# Patient Record
Sex: MALE | Race: WHITE | Employment: OTHER | URBAN - METROPOLITAN AREA
[De-identification: names, ages, dates, MRNs, and addresses within clinical notes are randomized per-mention and may not be internally consistent; named-entity substitution may affect disease eponyms.]

---

## 2017-01-17 ENCOUNTER — OFFICE VISIT (OUTPATIENT)
Dept: CARDIOLOGY CLINIC | Age: 67
End: 2017-01-17

## 2017-01-17 VITALS
HEART RATE: 60 BPM | BODY MASS INDEX: 27.13 KG/M2 | OXYGEN SATURATION: 98 % | HEIGHT: 68 IN | DIASTOLIC BLOOD PRESSURE: 78 MMHG | SYSTOLIC BLOOD PRESSURE: 126 MMHG | WEIGHT: 179 LBS

## 2017-01-17 DIAGNOSIS — I10 ESSENTIAL HYPERTENSION: ICD-10-CM

## 2017-01-17 DIAGNOSIS — E78.2 MIXED HYPERLIPIDEMIA: Primary | ICD-10-CM

## 2017-01-17 DIAGNOSIS — I25.10 CORONARY ARTERY DISEASE INVOLVING NATIVE CORONARY ARTERY OF NATIVE HEART WITHOUT ANGINA PECTORIS: ICD-10-CM

## 2017-01-17 PROCEDURE — 99214 OFFICE O/P EST MOD 30 MIN: CPT | Performed by: INTERNAL MEDICINE

## 2017-03-03 ENCOUNTER — OFFICE VISIT (OUTPATIENT)
Dept: FAMILY MEDICINE CLINIC | Age: 67
End: 2017-03-03

## 2017-03-03 VITALS
BODY MASS INDEX: 29.03 KG/M2 | WEIGHT: 185 LBS | HEIGHT: 67 IN | OXYGEN SATURATION: 97 % | DIASTOLIC BLOOD PRESSURE: 80 MMHG | HEART RATE: 63 BPM | SYSTOLIC BLOOD PRESSURE: 120 MMHG

## 2017-03-03 DIAGNOSIS — Z12.11 COLON CANCER SCREENING: ICD-10-CM

## 2017-03-03 DIAGNOSIS — Z23 NEED FOR PNEUMOCOCCAL VACCINATION: ICD-10-CM

## 2017-03-03 DIAGNOSIS — I10 ESSENTIAL HYPERTENSION: ICD-10-CM

## 2017-03-03 DIAGNOSIS — Z00.00 ROUTINE GENERAL MEDICAL EXAMINATION AT A HEALTH CARE FACILITY: Primary | ICD-10-CM

## 2017-03-03 LAB
HEPATITIS C ANTIBODY INTERPRETATION: NORMAL
T4 FREE: 1.2 NG/DL (ref 0.9–1.8)
TSH SERPL DL<=0.05 MIU/L-ACNC: 3.33 UIU/ML (ref 0.27–4.2)

## 2017-03-03 PROCEDURE — 99397 PER PM REEVAL EST PAT 65+ YR: CPT | Performed by: FAMILY MEDICINE

## 2017-03-03 PROCEDURE — G0009 ADMIN PNEUMOCOCCAL VACCINE: HCPCS | Performed by: FAMILY MEDICINE

## 2017-03-03 PROCEDURE — 36415 COLL VENOUS BLD VENIPUNCTURE: CPT | Performed by: FAMILY MEDICINE

## 2017-03-03 PROCEDURE — 90732 PPSV23 VACC 2 YRS+ SUBQ/IM: CPT | Performed by: FAMILY MEDICINE

## 2017-03-03 RX ORDER — LEVOTHYROXINE SODIUM 0.05 MG/1
TABLET ORAL
Qty: 30 TABLET | Refills: 5 | Status: SHIPPED | OUTPATIENT
Start: 2017-03-03 | End: 2018-01-12 | Stop reason: SDUPTHER

## 2017-03-04 LAB
ESTIMATED AVERAGE GLUCOSE: 131.2 MG/DL
HBA1C MFR BLD: 6.2 %

## 2017-03-28 ENCOUNTER — TELEPHONE (OUTPATIENT)
Dept: FAMILY MEDICINE CLINIC | Age: 67
End: 2017-03-28

## 2017-03-29 RX ORDER — AZITHROMYCIN 250 MG/1
250 TABLET, FILM COATED ORAL DAILY
Qty: 6 TABLET | Refills: 0 | Status: SHIPPED | OUTPATIENT
Start: 2017-03-29 | End: 2017-04-04

## 2017-07-28 DIAGNOSIS — R05.3 CHRONIC COUGH: ICD-10-CM

## 2017-07-28 DIAGNOSIS — R06.02 SHORTNESS OF BREATH: ICD-10-CM

## 2017-07-28 RX ORDER — ALBUTEROL SULFATE 90 UG/1
1-2 AEROSOL, METERED RESPIRATORY (INHALATION) EVERY 4 HOURS PRN
Qty: 1 INHALER | Refills: 1 | Status: SHIPPED | OUTPATIENT
Start: 2017-07-28 | End: 2018-03-09 | Stop reason: ALTCHOICE

## 2017-08-08 ENCOUNTER — OFFICE VISIT (OUTPATIENT)
Dept: CARDIOLOGY CLINIC | Age: 67
End: 2017-08-08

## 2017-08-08 VITALS
SYSTOLIC BLOOD PRESSURE: 122 MMHG | HEART RATE: 57 BPM | WEIGHT: 175 LBS | BODY MASS INDEX: 27.47 KG/M2 | HEIGHT: 67 IN | OXYGEN SATURATION: 97 % | DIASTOLIC BLOOD PRESSURE: 80 MMHG

## 2017-08-08 DIAGNOSIS — I10 ESSENTIAL HYPERTENSION: ICD-10-CM

## 2017-08-08 DIAGNOSIS — E78.2 MIXED HYPERLIPIDEMIA: Primary | ICD-10-CM

## 2017-08-08 DIAGNOSIS — I25.10 CORONARY ARTERY DISEASE INVOLVING NATIVE CORONARY ARTERY OF NATIVE HEART WITHOUT ANGINA PECTORIS: ICD-10-CM

## 2017-08-08 DIAGNOSIS — E03.9 HYPOTHYROIDISM, UNSPECIFIED TYPE: ICD-10-CM

## 2017-08-08 PROCEDURE — 99214 OFFICE O/P EST MOD 30 MIN: CPT | Performed by: INTERNAL MEDICINE

## 2017-08-08 RX ORDER — CLOPIDOGREL BISULFATE 75 MG/1
75 TABLET ORAL DAILY
Qty: 90 TABLET | Refills: 3 | Status: SHIPPED | OUTPATIENT
Start: 2017-08-08 | End: 2018-09-07 | Stop reason: SDUPTHER

## 2017-08-22 ENCOUNTER — OFFICE VISIT (OUTPATIENT)
Dept: FAMILY MEDICINE CLINIC | Age: 67
End: 2017-08-22

## 2017-08-22 VITALS
DIASTOLIC BLOOD PRESSURE: 86 MMHG | OXYGEN SATURATION: 98 % | WEIGHT: 173 LBS | BODY MASS INDEX: 27.15 KG/M2 | SYSTOLIC BLOOD PRESSURE: 120 MMHG | HEIGHT: 67 IN | HEART RATE: 59 BPM

## 2017-08-22 DIAGNOSIS — Z12.5 SCREENING PSA (PROSTATE SPECIFIC ANTIGEN): ICD-10-CM

## 2017-08-22 DIAGNOSIS — E78.2 MIXED HYPERLIPIDEMIA: ICD-10-CM

## 2017-08-22 DIAGNOSIS — E03.9 HYPOTHYROIDISM, UNSPECIFIED TYPE: ICD-10-CM

## 2017-08-22 DIAGNOSIS — Z12.11 COLON CANCER SCREENING: ICD-10-CM

## 2017-08-22 DIAGNOSIS — I10 ESSENTIAL HYPERTENSION: Primary | ICD-10-CM

## 2017-08-22 LAB
A/G RATIO: 1.6 (ref 1.1–2.2)
ALBUMIN SERPL-MCNC: 4.4 G/DL (ref 3.4–5)
ALP BLD-CCNC: 60 U/L (ref 40–129)
ALT SERPL-CCNC: 19 U/L (ref 10–40)
ANION GAP SERPL CALCULATED.3IONS-SCNC: 16 MMOL/L (ref 3–16)
AST SERPL-CCNC: 16 U/L (ref 15–37)
BASOPHILS ABSOLUTE: 0 K/UL (ref 0–0.2)
BASOPHILS RELATIVE PERCENT: 0.5 %
BILIRUB SERPL-MCNC: 0.9 MG/DL (ref 0–1)
BUN BLDV-MCNC: 11 MG/DL (ref 7–20)
CALCIUM SERPL-MCNC: 9.5 MG/DL (ref 8.3–10.6)
CHLORIDE BLD-SCNC: 101 MMOL/L (ref 99–110)
CHOLESTEROL, TOTAL: 216 MG/DL (ref 0–199)
CO2: 27 MMOL/L (ref 21–32)
CREAT SERPL-MCNC: 0.7 MG/DL (ref 0.8–1.3)
EOSINOPHILS ABSOLUTE: 0.2 K/UL (ref 0–0.6)
EOSINOPHILS RELATIVE PERCENT: 3.7 %
GFR AFRICAN AMERICAN: >60
GFR NON-AFRICAN AMERICAN: >60
GLOBULIN: 2.8 G/DL
GLUCOSE BLD-MCNC: 109 MG/DL (ref 70–99)
HCT VFR BLD CALC: 47.3 % (ref 40.5–52.5)
HDLC SERPL-MCNC: 51 MG/DL (ref 40–60)
HEMOGLOBIN: 15.8 G/DL (ref 13.5–17.5)
LDL CHOLESTEROL CALCULATED: 150 MG/DL
LYMPHOCYTES ABSOLUTE: 1.3 K/UL (ref 1–5.1)
LYMPHOCYTES RELATIVE PERCENT: 22.6 %
MCH RBC QN AUTO: 32 PG (ref 26–34)
MCHC RBC AUTO-ENTMCNC: 33.3 G/DL (ref 31–36)
MCV RBC AUTO: 95.8 FL (ref 80–100)
MONOCYTES ABSOLUTE: 0.5 K/UL (ref 0–1.3)
MONOCYTES RELATIVE PERCENT: 8.9 %
NEUTROPHILS ABSOLUTE: 3.7 K/UL (ref 1.7–7.7)
NEUTROPHILS RELATIVE PERCENT: 64.3 %
PDW BLD-RTO: 13.7 % (ref 12.4–15.4)
PLATELET # BLD: 197 K/UL (ref 135–450)
PMV BLD AUTO: 8.6 FL (ref 5–10.5)
POTASSIUM SERPL-SCNC: 4.6 MMOL/L (ref 3.5–5.1)
PROSTATE SPECIFIC ANTIGEN: 0.2 NG/ML (ref 0–4)
RBC # BLD: 4.93 M/UL (ref 4.2–5.9)
SODIUM BLD-SCNC: 144 MMOL/L (ref 136–145)
T4 FREE: 1.2 NG/DL (ref 0.9–1.8)
TOTAL PROTEIN: 7.2 G/DL (ref 6.4–8.2)
TRIGL SERPL-MCNC: 75 MG/DL (ref 0–150)
TSH SERPL DL<=0.05 MIU/L-ACNC: 3.14 UIU/ML (ref 0.27–4.2)
VLDLC SERPL CALC-MCNC: 15 MG/DL
WBC # BLD: 5.8 K/UL (ref 4–11)

## 2017-08-22 PROCEDURE — 36415 COLL VENOUS BLD VENIPUNCTURE: CPT | Performed by: FAMILY MEDICINE

## 2017-08-22 PROCEDURE — 99214 OFFICE O/P EST MOD 30 MIN: CPT | Performed by: FAMILY MEDICINE

## 2017-08-22 ASSESSMENT — ENCOUNTER SYMPTOMS
ABDOMINAL PAIN: 0
SHORTNESS OF BREATH: 0

## 2017-09-05 ENCOUNTER — TELEPHONE (OUTPATIENT)
Dept: FAMILY MEDICINE CLINIC | Age: 67
End: 2017-09-05

## 2017-10-10 ENCOUNTER — TELEPHONE (OUTPATIENT)
Dept: FAMILY MEDICINE CLINIC | Age: 67
End: 2017-10-10

## 2017-10-10 DIAGNOSIS — H91.92 DECREASED HEARING, LEFT: ICD-10-CM

## 2017-10-10 DIAGNOSIS — H92.02 LEFT EAR PAIN: Primary | ICD-10-CM

## 2018-02-27 RX ORDER — AZITHROMYCIN 250 MG/1
250 TABLET, FILM COATED ORAL DAILY
Qty: 10 TABLET | Refills: 0 | Status: SHIPPED | OUTPATIENT
Start: 2018-02-27 | End: 2018-03-09 | Stop reason: ALTCHOICE

## 2018-03-07 ENCOUNTER — TELEPHONE (OUTPATIENT)
Dept: CARDIOLOGY CLINIC | Age: 68
End: 2018-03-07

## 2018-03-07 NOTE — TELEPHONE ENCOUNTER
Clearance form printed and given to [place] for Dr. Cristian Shine. Dr. Merary Sosa will be in office on Friday 3/9/18.

## 2018-03-09 ENCOUNTER — OFFICE VISIT (OUTPATIENT)
Dept: FAMILY MEDICINE CLINIC | Age: 68
End: 2018-03-09

## 2018-03-09 VITALS
WEIGHT: 180 LBS | DIASTOLIC BLOOD PRESSURE: 70 MMHG | HEIGHT: 67 IN | HEART RATE: 74 BPM | OXYGEN SATURATION: 95 % | BODY MASS INDEX: 28.25 KG/M2 | SYSTOLIC BLOOD PRESSURE: 120 MMHG

## 2018-03-09 DIAGNOSIS — E03.9 HYPOTHYROIDISM, UNSPECIFIED TYPE: ICD-10-CM

## 2018-03-09 DIAGNOSIS — R73.02 IGT (IMPAIRED GLUCOSE TOLERANCE): ICD-10-CM

## 2018-03-09 DIAGNOSIS — E78.2 MIXED HYPERLIPIDEMIA: ICD-10-CM

## 2018-03-09 DIAGNOSIS — Z23 NEED FOR SHINGLES VACCINE: ICD-10-CM

## 2018-03-09 DIAGNOSIS — Z00.00 ROUTINE GENERAL MEDICAL EXAMINATION AT A HEALTH CARE FACILITY: Primary | ICD-10-CM

## 2018-03-09 LAB
A/G RATIO: 1.4 (ref 1.1–2.2)
ALBUMIN SERPL-MCNC: 4.4 G/DL (ref 3.4–5)
ALP BLD-CCNC: 60 U/L (ref 40–129)
ALT SERPL-CCNC: 19 U/L (ref 10–40)
ANION GAP SERPL CALCULATED.3IONS-SCNC: 19 MMOL/L (ref 3–16)
AST SERPL-CCNC: 17 U/L (ref 15–37)
BILIRUB SERPL-MCNC: 0.5 MG/DL (ref 0–1)
BUN BLDV-MCNC: 16 MG/DL (ref 7–20)
CALCIUM SERPL-MCNC: 8.8 MG/DL (ref 8.3–10.6)
CHLORIDE BLD-SCNC: 100 MMOL/L (ref 99–110)
CHOLESTEROL, TOTAL: 198 MG/DL (ref 0–199)
CO2: 24 MMOL/L (ref 21–32)
CREAT SERPL-MCNC: 0.7 MG/DL (ref 0.8–1.3)
GFR AFRICAN AMERICAN: >60
GFR NON-AFRICAN AMERICAN: >60
GLOBULIN: 3.1 G/DL
GLUCOSE BLD-MCNC: 102 MG/DL (ref 70–99)
HDLC SERPL-MCNC: 46 MG/DL (ref 40–60)
LDL CHOLESTEROL CALCULATED: 139 MG/DL
POTASSIUM SERPL-SCNC: 4.4 MMOL/L (ref 3.5–5.1)
SODIUM BLD-SCNC: 143 MMOL/L (ref 136–145)
T4 FREE: 1.3 NG/DL (ref 0.9–1.8)
TOTAL PROTEIN: 7.5 G/DL (ref 6.4–8.2)
TRIGL SERPL-MCNC: 66 MG/DL (ref 0–150)
TSH SERPL DL<=0.05 MIU/L-ACNC: 3.12 UIU/ML (ref 0.27–4.2)
VLDLC SERPL CALC-MCNC: 13 MG/DL

## 2018-03-09 PROCEDURE — G0438 PPPS, INITIAL VISIT: HCPCS | Performed by: FAMILY MEDICINE

## 2018-03-09 ASSESSMENT — PATIENT HEALTH QUESTIONNAIRE - PHQ9: SUM OF ALL RESPONSES TO PHQ QUESTIONS 1-9: 0

## 2018-03-09 ASSESSMENT — LIFESTYLE VARIABLES
HOW MANY STANDARD DRINKS CONTAINING ALCOHOL DO YOU HAVE ON A TYPICAL DAY: 0
HOW OFTEN DO YOU HAVE A DRINK CONTAINING ALCOHOL: 3
HOW OFTEN DO YOU HAVE SIX OR MORE DRINKS ON ONE OCCASION: 0
AUDIT-C TOTAL SCORE: 3

## 2018-03-09 ASSESSMENT — ANXIETY QUESTIONNAIRES: GAD7 TOTAL SCORE: 0

## 2018-03-09 NOTE — PATIENT INSTRUCTIONS
Personalized Preventive Plan for Brittney Tsang - 3/9/2018  Medicare offers a range of preventive health benefits. Some of the tests and screenings are paid in full while other may be subject to a deductible, co-insurance, and/or copay. Some of these benefits include a comprehensive review of your medical history including lifestyle, illnesses that may run in your family, and various assessments and screenings as appropriate. After reviewing your medical record and screening and assessments performed today your provider may have ordered immunizations, labs, imaging, and/or referrals for you. A list of these orders (if applicable) as well as your Preventive Care list are included within your After Visit Summary for your review. Other Preventive Recommendations:    · A preventive eye exam performed by an eye specialist is recommended every 1-2 years to screen for glaucoma; cataracts, macular degeneration, and other eye disorders. · A preventive dental visit is recommended every 6 months. · Try to get at least 150 minutes of exercise per week or 10,000 steps per day on a pedometer . · Order or download the FREE \"Exercise & Physical Activity: Your Everyday Guide\" from [employer]. Call 2-452.790.4982 or search [employer] online. · You need 4152-2411 mg of calcium and 8663-2080 IU of vitamin D per day. It is possible to meet your calcium requirement with diet alone, but a vitamin D supplement is usually necessary to meet this goal.  · When exposed to the sun, use a sunscreen that protects against both UVA and UVB radiation with an SPF of 30 or greater. Reapply every 2 to 3 hours or after sweating, drying off with a towel, or swimming. · Always wear a seat belt when traveling in a car. Always wear a helmet when riding a bicycle or motorcycle.

## 2018-03-09 NOTE — PROGRESS NOTES
removed   Anju Potter [address]      TONSILLECTOMY      TUMOR REMOVAL      left side     Family History   Problem Relation Age of Onset    Arthritis Mother     Heart Disease Father     Cancer Sister        CareTeam (Including outside providers/suppliers regularly involved in providing care):   Patient Care Team:  Vernell Marquez MD as PCP - Crystal Adame MD as Consulting Physician (Cardiology)  Emmett Serrano MD as Consulting Physician (Pulmonology)    Wt Readings from Last 3 Encounters:   03/09/18 180 lb (81.6 kg)   08/22/17 173 lb (78.5 kg)   08/08/17 175 lb (79.4 kg)     Vitals:    03/09/18 0747   BP: 120/70   Site: Right Arm   Position: Sitting   Cuff Size: Small Adult   Pulse: 74   SpO2: 95%   Weight: 180 lb (81.6 kg)   Height: 5' 7\" (1.702 m)           Patient's complete Health Risk Assessment and screening values have been reviewed and are found in Flowsheets. The following problems were reviewed today and where indicated follow up appointments were made and/or referrals ordered. Positive Risk Factor Screenings with Interventions:               Health Habits/Nutrition:  Health Habits/Nutrition  Do you exercise for at least 20 minutes 2-3 times per week?: (!) No  Have you lost any weight without trying in the past 3 months?: No  Do you eat fewer than 2 meals per day?: No  Have you seen a dentist within the past year?: Yes  Body mass index is 28.19 kg/m².   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

## 2018-03-10 LAB
ESTIMATED AVERAGE GLUCOSE: 122.6 MG/DL
HBA1C MFR BLD: 5.9 %

## 2018-04-10 ENCOUNTER — OFFICE VISIT (OUTPATIENT)
Dept: CARDIOLOGY CLINIC | Age: 68
End: 2018-04-10

## 2018-04-10 VITALS
HEART RATE: 64 BPM | SYSTOLIC BLOOD PRESSURE: 130 MMHG | OXYGEN SATURATION: 94 % | DIASTOLIC BLOOD PRESSURE: 74 MMHG | WEIGHT: 185 LBS | BODY MASS INDEX: 29.03 KG/M2 | HEIGHT: 67 IN

## 2018-04-10 DIAGNOSIS — E78.2 MIXED HYPERLIPIDEMIA: Primary | ICD-10-CM

## 2018-04-10 DIAGNOSIS — I25.10 CORONARY ARTERY DISEASE INVOLVING NATIVE CORONARY ARTERY OF NATIVE HEART WITHOUT ANGINA PECTORIS: ICD-10-CM

## 2018-04-10 DIAGNOSIS — I10 ESSENTIAL HYPERTENSION: ICD-10-CM

## 2018-04-10 PROCEDURE — 99214 OFFICE O/P EST MOD 30 MIN: CPT | Performed by: INTERNAL MEDICINE

## 2018-04-10 RX ORDER — SIMVASTATIN 20 MG
20 TABLET ORAL NIGHTLY
Qty: 30 TABLET | Refills: 11 | Status: SHIPPED | OUTPATIENT
Start: 2018-04-10 | End: 2019-02-05 | Stop reason: SDUPTHER

## 2018-05-24 RX ORDER — NITROGLYCERIN 0.4 MG/1
0.4 TABLET SUBLINGUAL EVERY 5 MIN PRN
Qty: 25 TABLET | Refills: 3 | Status: SHIPPED | OUTPATIENT
Start: 2018-05-24 | End: 2019-09-12

## 2018-07-11 RX ORDER — LEVOFLOXACIN 500 MG/1
500 TABLET, FILM COATED ORAL DAILY
Qty: 10 TABLET | Refills: 0 | Status: SHIPPED | OUTPATIENT
Start: 2018-07-11 | End: 2018-07-21

## 2018-09-07 RX ORDER — CLOPIDOGREL BISULFATE 75 MG/1
75 TABLET ORAL DAILY
Qty: 90 TABLET | Refills: 1 | Status: SHIPPED | OUTPATIENT
Start: 2018-09-07 | End: 2019-03-06 | Stop reason: SDUPTHER

## 2018-10-01 ENCOUNTER — HOSPITAL ENCOUNTER (OUTPATIENT)
Age: 68
Discharge: HOME | End: 2018-10-01
Payer: MEDICARE

## 2018-10-01 LAB
CHOLESTEROL, TOTAL: 120 MG/DL (ref 0–199)
HDLC SERPL-MCNC: 45 MG/DL (ref 40–60)
LDL CHOLESTEROL CALCULATED: 61 MG/DL
TRIGL SERPL-MCNC: 71 MG/DL (ref 0–150)
VLDLC SERPL CALC-MCNC: 14 MG/DL

## 2018-10-01 PROCEDURE — 36415 COLL VENOUS BLD VENIPUNCTURE: CPT

## 2018-10-01 PROCEDURE — 80061 LIPID PANEL: CPT

## 2018-10-02 ENCOUNTER — TELEPHONE (OUTPATIENT)
Dept: CARDIOLOGY CLINIC | Age: 68
End: 2018-10-02

## 2018-11-13 ENCOUNTER — OFFICE VISIT (OUTPATIENT)
Dept: CARDIOLOGY CLINIC | Age: 68
End: 2018-11-13
Payer: MEDICARE

## 2018-11-13 VITALS
SYSTOLIC BLOOD PRESSURE: 114 MMHG | BODY MASS INDEX: 28.72 KG/M2 | HEIGHT: 67 IN | WEIGHT: 183 LBS | DIASTOLIC BLOOD PRESSURE: 70 MMHG | OXYGEN SATURATION: 96 % | HEART RATE: 63 BPM

## 2018-11-13 DIAGNOSIS — R73.9 HYPERGLYCEMIA: ICD-10-CM

## 2018-11-13 DIAGNOSIS — E78.2 MIXED HYPERLIPIDEMIA: ICD-10-CM

## 2018-11-13 DIAGNOSIS — I10 ESSENTIAL HYPERTENSION: ICD-10-CM

## 2018-11-13 DIAGNOSIS — I25.10 CORONARY ARTERY DISEASE INVOLVING NATIVE CORONARY ARTERY OF NATIVE HEART WITHOUT ANGINA PECTORIS: Primary | ICD-10-CM

## 2018-11-13 PROCEDURE — 99214 OFFICE O/P EST MOD 30 MIN: CPT | Performed by: INTERNAL MEDICINE

## 2018-11-13 NOTE — COMMUNICATION BODY
disease, hyperlipidemia, hypertension, discuss labs and shortness of breath. Diagnoses and all orders for this visit:    Coronary artery disease involving native coronary artery of native heart without angina pectoris    Essential hypertension    Mixed hyperlipidemia    Last lipids 10/01/18  Labs reviewed in epic       Plan:  1.  will check cmp, cbc, tsh, HgbA1c in march 2019  2.  meds reviewed no refills warranted  3. Scheduled return visit 12 months. 4. Healthy lifestyle education reviewed including nutrition, exercise and activity  5. Breathing exercise. QUALITY MEASURES  1. Tobacco Cessation Counseling: NA  2. Retake of BP if >140/90:   NA  3. Documentation to PCP/referring for new patient:  Sent to PCP at close of office visit  4. CAD patient on anti-platelet: DAPT  5. CAD patient on STATIN therapy:  On  zocor  6.  Patient with CHF and aFib on anticoagulation:  NA     [address] [place], MD 11/13/2018 1:51 PM

## 2018-11-13 NOTE — PROGRESS NOTES
good pulses  Neuro: intact    Medications:   Outpatient Encounter Prescriptions as of 11/13/2018   Medication Sig Dispense Refill    clopidogrel (PLAVIX) 75 MG tablet TAKE 1 TABLET BY MOUTH DAILY 90 tablet 1    levothyroxine (SYNTHROID) 50 MCG tablet TAKE 1 TABLET BY MOUTH DAILY. 30 tablet 8    nitroGLYCERIN (NITROSTAT) 0.4 MG SL tablet Place 1 tablet under the tongue every 5 minutes as needed for Chest pain 25 tablet 3    simvastatin (ZOCOR) 20 MG tablet Take 1 tablet by mouth nightly 30 tablet 11    zoster recombinant adjuvanted vaccine (SHINGRIX) 50 MCG SUSR injection Inject 0.5 mLs into the muscle every 6 months for 2 doses 0.5 mL 1    Omega-3 Fatty Acids (OMEGA-3 FISH OIL) 1000 MG CAPS Take by mouth      aspirin 81 MG tablet Take 81 mg by mouth daily.  vitamin D (CHOLECALCIFEROL) 1000 UNIT TABS tablet Take 1,000 Units by mouth daily. No facility-administered encounter medications on file as of 11/13/2018. Lab Data:      Lab Results   Component Value Date    CHOL 120 10/01/2018    CHOL 198 03/09/2018    CHOL 216 (H) 08/22/2017     Lab Results   Component Value Date    TRIG 71 10/01/2018    TRIG 66 03/09/2018    TRIG 75 08/22/2017     Lab Results   Component Value Date    HDL 45 10/01/2018    HDL 46 03/09/2018    HDL 51 08/22/2017     Lab Results   Component Value Date    LDLCALC 61 10/01/2018    LDLCALC 139 (H) 03/09/2018    LDLCALC 150 (H) 08/22/2017     Lab Results   Component Value Date    LABVLDL 14 10/01/2018    LABVLDL 13 03/09/2018    LABVLDL 15 08/22/2017     No results found for: CHOLHDLRATIO  PT/INR: No results for input(s): PROTIME, INR in the last 72 hours. A1C:   Lab Results   Component Value Date    LABA1C 5.9 03/09/2018       IMAGING:   CATH 7/6/16  LM <20%  LAD 20% prox, mid stent patent  D1 50%  Cx 20-30%  RCA 50%  LVEF 55%    ECHO 7/6/16  Summary   Normal left ventricle size, wall thickness and systolic function with an   estimated ejection fraction of 55%.  No disease, hyperlipidemia, hypertension, discuss labs and shortness of breath. Diagnoses and all orders for this visit:    Coronary artery disease involving native coronary artery of native heart without angina pectoris    Essential hypertension    Mixed hyperlipidemia    Last lipids 10/01/18  Labs reviewed in epic       Plan:  1.  will check cmp, cbc, tsh, HgbA1c in march 2019  2.  meds reviewed no refills warranted  3. Scheduled return visit 12 months. 4. Healthy lifestyle education reviewed including nutrition, exercise and activity  5. Breathing exercise. QUALITY MEASURES  1. Tobacco Cessation Counseling: NA  2. Retake of BP if >140/90:   NA  3. Documentation to PCP/referring for new patient:  Sent to PCP at close of office visit  4. CAD patient on anti-platelet: DAPT  5. CAD patient on STATIN therapy:  On  zocor  6.  Patient with CHF and aFib on anticoagulation:  NA     [address] [place], MD 11/13/2018 1:51 PM

## 2019-02-07 RX ORDER — SIMVASTATIN 20 MG
20 TABLET ORAL NIGHTLY
Qty: 90 TABLET | Refills: 3 | Status: SHIPPED | OUTPATIENT
Start: 2019-02-07 | End: 2019-09-12 | Stop reason: SDUPTHER

## 2019-03-06 RX ORDER — CLOPIDOGREL BISULFATE 75 MG/1
75 TABLET ORAL DAILY
Qty: 90 TABLET | Refills: 3 | Status: SHIPPED | OUTPATIENT
Start: 2019-03-06 | End: 2019-09-12 | Stop reason: SDUPTHER

## 2019-03-11 ENCOUNTER — OFFICE VISIT (OUTPATIENT)
Dept: FAMILY MEDICINE CLINIC | Age: 69
End: 2019-03-11
Payer: MEDICARE

## 2019-03-11 ENCOUNTER — NURSE ONLY (OUTPATIENT)
Dept: FAMILY MEDICINE CLINIC | Age: 69
End: 2019-03-11
Payer: MEDICARE

## 2019-03-11 VITALS
DIASTOLIC BLOOD PRESSURE: 66 MMHG | HEIGHT: 67 IN | WEIGHT: 185 LBS | BODY MASS INDEX: 29.03 KG/M2 | SYSTOLIC BLOOD PRESSURE: 118 MMHG | OXYGEN SATURATION: 95 % | HEART RATE: 58 BPM

## 2019-03-11 DIAGNOSIS — R07.9 EXERTIONAL CHEST PAIN: ICD-10-CM

## 2019-03-11 DIAGNOSIS — E78.2 MIXED HYPERLIPIDEMIA: ICD-10-CM

## 2019-03-11 DIAGNOSIS — Z12.11 SCREENING FOR COLON CANCER: ICD-10-CM

## 2019-03-11 DIAGNOSIS — R07.89 OTHER CHEST PAIN: Primary | ICD-10-CM

## 2019-03-11 DIAGNOSIS — Z00.00 ROUTINE GENERAL MEDICAL EXAMINATION AT A HEALTH CARE FACILITY: Primary | ICD-10-CM

## 2019-03-11 DIAGNOSIS — J06.9 VIRAL URI: ICD-10-CM

## 2019-03-11 DIAGNOSIS — R73.02 IGT (IMPAIRED GLUCOSE TOLERANCE): ICD-10-CM

## 2019-03-11 DIAGNOSIS — R20.0 LEFT ARM NUMBNESS: ICD-10-CM

## 2019-03-11 DIAGNOSIS — I10 ESSENTIAL HYPERTENSION: ICD-10-CM

## 2019-03-11 DIAGNOSIS — E03.9 HYPOTHYROIDISM, UNSPECIFIED TYPE: ICD-10-CM

## 2019-03-11 LAB
A/G RATIO: 1.8 (ref 1.1–2.2)
ALBUMIN SERPL-MCNC: 4.5 G/DL (ref 3.4–5)
ALP BLD-CCNC: 59 U/L (ref 40–129)
ALT SERPL-CCNC: 21 U/L (ref 10–40)
ANION GAP SERPL CALCULATED.3IONS-SCNC: 13 MMOL/L (ref 3–16)
AST SERPL-CCNC: 18 U/L (ref 15–37)
BASOPHILS ABSOLUTE: 0 K/UL (ref 0–0.2)
BASOPHILS RELATIVE PERCENT: 0.2 %
BILIRUB SERPL-MCNC: 0.5 MG/DL (ref 0–1)
BUN BLDV-MCNC: 11 MG/DL (ref 7–20)
CALCIUM SERPL-MCNC: 9.1 MG/DL (ref 8.3–10.6)
CHLORIDE BLD-SCNC: 102 MMOL/L (ref 99–110)
CHOLESTEROL, TOTAL: 120 MG/DL (ref 0–199)
CO2: 26 MMOL/L (ref 21–32)
CREAT SERPL-MCNC: 0.7 MG/DL (ref 0.8–1.3)
EOSINOPHILS ABSOLUTE: 0.2 K/UL (ref 0–0.6)
EOSINOPHILS RELATIVE PERCENT: 2.6 %
GFR AFRICAN AMERICAN: >60
GFR NON-AFRICAN AMERICAN: >60
GLOBULIN: 2.5 G/DL
GLUCOSE BLD-MCNC: 105 MG/DL (ref 70–99)
HCT VFR BLD CALC: 45.1 % (ref 40.5–52.5)
HDLC SERPL-MCNC: 52 MG/DL (ref 40–60)
HEMOGLOBIN: 15.2 G/DL (ref 13.5–17.5)
LDL CHOLESTEROL CALCULATED: 57 MG/DL
LYMPHOCYTES ABSOLUTE: 1.3 K/UL (ref 1–5.1)
LYMPHOCYTES RELATIVE PERCENT: 14.4 %
MCH RBC QN AUTO: 32.2 PG (ref 26–34)
MCHC RBC AUTO-ENTMCNC: 33.8 G/DL (ref 31–36)
MCV RBC AUTO: 95.4 FL (ref 80–100)
MONOCYTES ABSOLUTE: 0.7 K/UL (ref 0–1.3)
MONOCYTES RELATIVE PERCENT: 7.5 %
NEUTROPHILS ABSOLUTE: 6.7 K/UL (ref 1.7–7.7)
NEUTROPHILS RELATIVE PERCENT: 75.3 %
PDW BLD-RTO: 13.4 % (ref 12.4–15.4)
PLATELET # BLD: 183 K/UL (ref 135–450)
PMV BLD AUTO: 8.4 FL (ref 5–10.5)
POTASSIUM SERPL-SCNC: 4.1 MMOL/L (ref 3.5–5.1)
RBC # BLD: 4.73 M/UL (ref 4.2–5.9)
SODIUM BLD-SCNC: 141 MMOL/L (ref 136–145)
T4 FREE: 1.1 NG/DL (ref 0.9–1.8)
TOTAL PROTEIN: 7 G/DL (ref 6.4–8.2)
TRIGL SERPL-MCNC: 57 MG/DL (ref 0–150)
TSH SERPL DL<=0.05 MIU/L-ACNC: 3.19 UIU/ML (ref 0.27–4.2)
VLDLC SERPL CALC-MCNC: 11 MG/DL
WBC # BLD: 8.9 K/UL (ref 4–11)

## 2019-03-11 PROCEDURE — G0439 PPPS, SUBSEQ VISIT: HCPCS | Performed by: FAMILY MEDICINE

## 2019-03-11 PROCEDURE — 99214 OFFICE O/P EST MOD 30 MIN: CPT | Performed by: FAMILY MEDICINE

## 2019-03-11 PROCEDURE — 93000 ELECTROCARDIOGRAM COMPLETE: CPT | Performed by: FAMILY MEDICINE

## 2019-03-11 PROCEDURE — 36415 COLL VENOUS BLD VENIPUNCTURE: CPT | Performed by: FAMILY MEDICINE

## 2019-03-11 ASSESSMENT — LIFESTYLE VARIABLES
HOW OFTEN DURING THE LAST YEAR HAVE YOU HAD A FEELING OF GUILT OR REMORSE AFTER DRINKING: 0
HOW OFTEN DO YOU HAVE SIX OR MORE DRINKS ON ONE OCCASION: 0
HOW OFTEN DURING THE LAST YEAR HAVE YOU FOUND THAT YOU WERE NOT ABLE TO STOP DRINKING ONCE YOU HAD STARTED: 0
HAS A RELATIVE, FRIEND, DOCTOR, OR ANOTHER HEALTH PROFESSIONAL EXPRESSED CONCERN ABOUT YOUR DRINKING OR SUGGESTED YOU CUT DOWN: 0
HOW OFTEN DURING THE LAST YEAR HAVE YOU FAILED TO DO WHAT WAS NORMALLY EXPECTED FROM YOU BECAUSE OF DRINKING: 0
AUDIT TOTAL SCORE: 1
AUDIT-C TOTAL SCORE: 1
HOW MANY STANDARD DRINKS CONTAINING ALCOHOL DO YOU HAVE ON A TYPICAL DAY: 0
HOW OFTEN DURING THE LAST YEAR HAVE YOU NEEDED AN ALCOHOLIC DRINK FIRST THING IN THE MORNING TO GET YOURSELF GOING AFTER A NIGHT OF HEAVY DRINKING: 0
HOW OFTEN DURING THE LAST YEAR HAVE YOU BEEN UNABLE TO REMEMBER WHAT HAPPENED THE NIGHT BEFORE BECAUSE YOU HAD BEEN DRINKING: 0
HAVE YOU OR SOMEONE ELSE BEEN INJURED AS A RESULT OF YOUR DRINKING: 0
HOW OFTEN DO YOU HAVE A DRINK CONTAINING ALCOHOL: 1

## 2019-03-11 ASSESSMENT — PATIENT HEALTH QUESTIONNAIRE - PHQ9
SUM OF ALL RESPONSES TO PHQ QUESTIONS 1-9: 0
SUM OF ALL RESPONSES TO PHQ QUESTIONS 1-9: 0

## 2019-03-11 ASSESSMENT — ANXIETY QUESTIONNAIRES: GAD7 TOTAL SCORE: 0

## 2019-03-11 ASSESSMENT — ENCOUNTER SYMPTOMS
SHORTNESS OF BREATH: 0
COUGH: 0

## 2019-03-12 LAB
ESTIMATED AVERAGE GLUCOSE: 122.6 MG/DL
HBA1C MFR BLD: 5.9 %

## 2019-03-18 RX ORDER — LEVOTHYROXINE SODIUM 0.05 MG/1
TABLET ORAL
Qty: 30 TABLET | Refills: 2 | Status: SHIPPED | OUTPATIENT
Start: 2019-03-18 | End: 2019-06-16 | Stop reason: SDUPTHER

## 2019-05-30 RX ORDER — AZITHROMYCIN 250 MG/1
250 TABLET, FILM COATED ORAL SEE ADMIN INSTRUCTIONS
Qty: 6 TABLET | Refills: 0 | Status: SHIPPED | OUTPATIENT
Start: 2019-05-30 | End: 2019-06-04

## 2019-07-03 ENCOUNTER — TELEPHONE (OUTPATIENT)
Dept: CARDIOLOGY | Age: 69
End: 2019-07-03

## 2019-07-03 RX ORDER — NITROGLYCERIN 0.4 MG/1
0.4 TABLET SUBLINGUAL EVERY 5 MIN PRN
Qty: 25 TABLET | Refills: 3 | Status: SHIPPED | OUTPATIENT
Start: 2019-07-03 | End: 2021-01-05 | Stop reason: SDUPTHER

## 2019-09-12 ENCOUNTER — OFFICE VISIT (OUTPATIENT)
Dept: CARDIOLOGY CLINIC | Age: 69
End: 2019-09-12
Payer: MEDICARE

## 2019-09-12 VITALS
SYSTOLIC BLOOD PRESSURE: 130 MMHG | BODY MASS INDEX: 27.15 KG/M2 | WEIGHT: 173 LBS | HEIGHT: 67 IN | DIASTOLIC BLOOD PRESSURE: 90 MMHG | HEART RATE: 59 BPM | OXYGEN SATURATION: 98 %

## 2019-09-12 DIAGNOSIS — I10 ESSENTIAL HYPERTENSION: ICD-10-CM

## 2019-09-12 DIAGNOSIS — R06.02 SOB (SHORTNESS OF BREATH): ICD-10-CM

## 2019-09-12 DIAGNOSIS — R42 DIZZINESS: ICD-10-CM

## 2019-09-12 DIAGNOSIS — E78.2 MIXED HYPERLIPIDEMIA: ICD-10-CM

## 2019-09-12 DIAGNOSIS — I25.10 CORONARY ARTERY DISEASE INVOLVING NATIVE CORONARY ARTERY OF NATIVE HEART WITHOUT ANGINA PECTORIS: Primary | ICD-10-CM

## 2019-09-12 PROCEDURE — 99214 OFFICE O/P EST MOD 30 MIN: CPT | Performed by: INTERNAL MEDICINE

## 2019-09-12 RX ORDER — SIMVASTATIN 20 MG
20 TABLET ORAL NIGHTLY
Qty: 90 TABLET | Refills: 3 | Status: SHIPPED | OUTPATIENT
Start: 2019-09-12 | End: 2020-12-11 | Stop reason: SDUPTHER

## 2019-09-12 RX ORDER — CLOPIDOGREL BISULFATE 75 MG/1
75 TABLET ORAL DAILY
Qty: 90 TABLET | Refills: 3 | Status: SHIPPED | OUTPATIENT
Start: 2019-09-12 | End: 2020-12-11 | Stop reason: SDUPTHER

## 2019-09-12 NOTE — PATIENT INSTRUCTIONS
[address] Office Note  9/12/2019     Subjective:  Mr. Quan Mixon is here today for cardiology follow up of hyperlipidemia, CAD, HTN        [place]:  Today he reports feeling well. 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 He random intermittent SOB. 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 He is a  reports intermittent dizziness when getting up from working on cars. Denies chest pain,  edema, , palpitations and syncope. BP  Today 110/70 sitting,  130/90 standing. No other complaints    PMH  HTN, hyperlipidemia, hypothyroidism, murmur, who presented to the hospital with complaints of chest tightness. He reported feeling dizzy and lightheaded intermittently for a week. He had a brief episode of exertional chest tightness last week. He reported  walking up 2 flights of stairs he felt light headed and dizzy. After walking back down stairs he began to have mid sternal chest tightness/heaviness felt as if something was standing on his chest with radiation in to his jaws and pressure in his back of neck. He had associated SOB, nausea and continued to feel dizzy. Annamary Ripa He rested for a few minutes and symptoms improved, but moved 12 tires and symptoms increased. He was working and tried to ignore symptoms, but finally drove himself to ER for further evaluation. He reports his legs feeling weak and he slid down wall waiting in ER. In the ER his EKG revealed Sinus bradycardia Otherwise normal, troponin was negative x 2. He was administered SL nitro which resolved his symptoms He was transferred to [facility] and underwent LHC 3/25/15 with stent placement. He came back to hospital in early July 2016 with complaints of Chest pain and SOB. He ruled out for MI and was transferred to [facility] for LHC. Cardiac cath as reported below did not show any significant stenosis. Since then he has seen Dr Melchor Irwin and got treated with prednisone and significant improvement in SOB.   He spleen  Extremities:   No edema, clubbing, cyanosis,  Pulses: Femoral and pedal pulses are normal.Neuro: intact    Medications:   Outpatient Encounter Medications as of 9/12/2019   Medication Sig Dispense Refill    simvastatin (ZOCOR) 20 MG tablet Take 1 tablet by mouth nightly 90 tablet 3    clopidogrel (PLAVIX) 75 MG tablet Take 1 tablet by mouth daily 90 tablet 3    nitroGLYCERIN (NITROSTAT) 0.4 MG SL tablet Place 1 tablet under the tongue every 5 minutes as needed for Chest pain up to max of 3 total doses. If no relief after 1 dose, call 911. 25 tablet 3    levothyroxine (SYNTHROID) 50 MCG tablet TAKE 1 TABLET BY MOUTH EVERY DAY 30 tablet 5    zoster recombinant adjuvanted vaccine (SHINGRIX) 50 MCG/0.5ML SUSR injection Inject 0.5 mLs into the muscle See Admin Instructions 1 dose now and repeat in 2-6 months 1 each 1    Omega-3 Fatty Acids (OMEGA-3 FISH OIL) 1000 MG CAPS Take by mouth      aspirin 81 MG tablet Take 81 mg by mouth daily.  vitamin D (CHOLECALCIFEROL) 1000 UNIT TABS tablet Take 1,000 Units by mouth daily.  [DISCONTINUED] clopidogrel (PLAVIX) 75 MG tablet TAKE 1 TABLET BY MOUTH DAILY 90 tablet 3    [DISCONTINUED] simvastatin (ZOCOR) 20 MG tablet TAKE 1 TABLET BY MOUTH NIGHTLY 90 tablet 3    [DISCONTINUED] nitroGLYCERIN (NITROSTAT) 0.4 MG SL tablet Place 1 tablet under the tongue every 5 minutes as needed for Chest pain 25 tablet 3     No facility-administered encounter medications on file as of 9/12/2019.          Lab Data:      Lab Results   Component Value Date    CHOL 120 03/11/2019    CHOL 120 10/01/2018    CHOL 198 03/09/2018     Lab Results   Component Value Date    TRIG 57 03/11/2019    TRIG 71 10/01/2018    TRIG 66 03/09/2018     Lab Results   Component Value Date    HDL 52 03/11/2019    HDL 45 10/01/2018    HDL 46 03/09/2018     Lab Results   Component Value Date    LDLCALC 57 03/11/2019    LDLCALC 61 10/01/2018    LDLCALC 139 (H) 03/09/2018     Lab Results   Component motion abnormalities  are seen. Diastolic filling parameters suggest grade I diastolic   dysfunction. Impaired  LV relaxation. The mitral valve is normal in structure and function. There is trivial  mitral regurgitation. The aortic valve is normal in structure and function. There is no  significant aortic regurgitation. The aortic root is normal in size. Ascending aorta is slight enlarged at  3.7cm. Right ventricular systolic function is normal .  The right ventricle is enlarged. The right atrium is dilated. Assessment:  Ke Villavicencio was seen today for 6 month follow-up, coronary artery disease, hyperlipidemia, hypertension, cardiac valve problem, shortness of breath, dizziness and headache. Diagnoses and all orders for this visit:    Coronary artery disease involving native coronary artery of native heart without angina pectoris  -     VL DUP CAROTID BILATERAL; Future  -     COMPREHENSIVE METABOLIC PANEL; Future  -     LIPID PANEL; Future  -     TSH with Reflex; Future    Essential hypertension  -     COMPREHENSIVE METABOLIC PANEL; Future  -     LIPID PANEL; Future  -     TSH with Reflex; Future    Mixed hyperlipidemia  -     VL DUP CAROTID BILATERAL; Future  -     COMPREHENSIVE METABOLIC PANEL; Future  -     LIPID PANEL; Future  -     TSH with Reflex; Future    SOB (shortness of breath)  -     VL DUP CAROTID BILATERAL; Future  -     COMPREHENSIVE METABOLIC PANEL; Future  -     LIPID PANEL; Future  -     TSH with Reflex; Future    Dizziness  -     VL DUP CAROTID BILATERAL; Future  -     COMPREHENSIVE METABOLIC PANEL; Future  -     LIPID PANEL; Future  -     TSH with Reflex; Future    Other orders  -     simvastatin (ZOCOR) 20 MG tablet; Take 1 tablet by mouth nightly  -     clopidogrel (PLAVIX) 75 MG tablet; Take 1 tablet by mouth daily    Last lipids 3/11/19  Labs reviewed in [facility]   Dizziness no evidence of orthostatic drop in BP      Plan:  1. Stay well hydrated with water.  Refrain from beverages containing

## 2019-09-12 NOTE — PROGRESS NOTES
[address] Office Note  9/12/2019     Subjective:  Mr. Criss Taylor is here today for cardiology follow up of hyperlipidemia, CAD, HTN        [place]:  Today he reports feeling well. 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 He random intermittent SOB. 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 He is a  reports intermittent dizziness when getting up from working on cars. Denies chest pain,  edema, , palpitations and syncope. BP  Today 110/70 sitting,  130/90 standing. No other complaints    PMH  HTN, hyperlipidemia, hypothyroidism, murmur, who presented to the hospital with complaints of chest tightness. He reported feeling dizzy and lightheaded intermittently for a week. He had a brief episode of exertional chest tightness last week. He reported  walking up 2 flights of stairs he felt light headed and dizzy. After walking back down stairs he began to have mid sternal chest tightness/heaviness felt as if something was standing on his chest with radiation in to his jaws and pressure in his back of neck. He had associated SOB, nausea and continued to feel dizzy. Parviz Contreras He rested for a few minutes and symptoms improved, but moved 12 tires and symptoms increased. He was working and tried to ignore symptoms, but finally drove himself to ER for further evaluation. He reports his legs feeling weak and he slid down wall waiting in ER. In the ER his EKG revealed Sinus bradycardia Otherwise normal, troponin was negative x 2. He was administered SL nitro which resolved his symptoms He was transferred to [facility] and underwent LHC 3/25/15 with stent placement. He came back to hospital in early July 2016 with complaints of Chest pain and SOB. He ruled out for MI and was transferred to [facility] for LHC. Cardiac cath as reported below did not show any significant stenosis. Since then he has seen Dr Alice Rao and got treated with prednisone and significant improvement in SOB.   He then reported muscle aches. Review of Systems:  12 point ROS negative in all areas as listed below except as in [facility]  Constitutional, EENT, Cardiovascular, pulmonary, GI, , Musculoskeletal, skin, neurological, hematological, endocrine, Psychiatric    Reviewed past medical history, social, and family history. Non smoker, no alocohol  Family history  Non contributory  Past Medical History:   Diagnosis Date    Amnesia 9/1/2012    Arthritis 3/31/2011    Benign positional vertigo 3/31/2011    CAD (coronary artery disease)     Carotid artery stenosis 8/18/2014    Heart murmur 1970    HTN (hypertension) 3/31/2011    Hyperlipidemia     Hypothyroid 3/2011    Neck pain 3/31/2011    S/P cardiac cath      Past Surgical History:   Procedure Laterality Date    COLONOSCOPY      CORONARY ANGIOPLASTY WITH STENT PLACEMENT  03/25/2015    DESx1 mLAD xience alpine 3x8    DIAGNOSTIC CARDIAC CATH LAB PROCEDURE      ENDOSCOPY, COLON, DIAGNOSTIC      EYE SURGERY      calcium deposits removed    HEMORRHOID SURGERY  1988    HERNIA REPAIR      TONSILLECTOMY      TUMOR REMOVAL      left side       Objective:   BP (!) 130/90   Pulse 59   Ht 5' 7\" (1.702 m)   Wt 173 lb (78.5 kg)   SpO2 98%   BMI 27.10 kg/m²     Wt Readings from Last 3 Encounters:   09/12/19 173 lb (78.5 kg)   03/11/19 185 lb (83.9 kg)   11/13/18 183 lb (83 kg)       Physical Exam:  General: No Respiratory distress, appears well developed and well nourished. Eyes:  Sclera nonicteric  Nose/Sinuses:  negative findings: nose shows no deformity, asymmetry, or inflammation, nasal mucosa normal, septum midline with no perforation or bleeding  Back:  no pain to palpation  Joint:  no active joint inflammation  Musculoskeletal:  negative  Skin:  Warm and dry  Neck:  Negative for JVD and Carotid Bruits. Chest:  Clear to auscultation, respiration easy  Cardiovascular:  RRR,  S1S2 normal, No murmur , no rub or thrill.   Abdomen:  Soft normal liver and

## 2019-12-16 RX ORDER — LEVOTHYROXINE SODIUM 0.05 MG/1
TABLET ORAL
Qty: 90 TABLET | Refills: 0 | Status: SHIPPED | OUTPATIENT
Start: 2019-12-16 | End: 2020-03-13

## 2020-03-12 NOTE — TELEPHONE ENCOUNTER
Refill Request     Last Seen: 3/9/2018    Last Written: #90  0rf  12/16/2019    Next Appointment:   No future appointments.           Requested Prescriptions     Pending Prescriptions Disp Refills    levothyroxine (SYNTHROID) 50 MCG tablet [Pharmacy Med Name: LEVOTHYROXINE 50 MCG TABLET] 90 tablet 0     Sig: TAKE 1 TABLET BY MOUTH EVERY DAY

## 2020-03-13 RX ORDER — LEVOTHYROXINE SODIUM 0.05 MG/1
TABLET ORAL
Qty: 90 TABLET | Refills: 0 | Status: SHIPPED | OUTPATIENT
Start: 2020-03-13 | End: 2020-06-08

## 2020-06-08 RX ORDER — LEVOTHYROXINE SODIUM 0.05 MG/1
TABLET ORAL
Qty: 90 TABLET | Refills: 0 | Status: SHIPPED | OUTPATIENT
Start: 2020-06-08 | End: 2020-09-03

## 2020-09-04 ENCOUNTER — TELEPHONE (OUTPATIENT)
Dept: FAMILY MEDICINE CLINIC | Age: 70
End: 2020-09-04

## 2020-09-04 NOTE — TELEPHONE ENCOUNTER
An individual called in to let the office know that the phone number [phone] no longer belonged to the patient, German Gomez. This number has been removed from patients account.
intact

## 2020-09-08 ENCOUNTER — TELEPHONE (OUTPATIENT)
Dept: FAMILY MEDICINE CLINIC | Age: 70
End: 2020-09-08

## 2020-09-11 DIAGNOSIS — I10 ESSENTIAL HYPERTENSION: ICD-10-CM

## 2020-09-11 DIAGNOSIS — E78.2 MIXED HYPERLIPIDEMIA: ICD-10-CM

## 2020-09-11 DIAGNOSIS — R06.02 SOB (SHORTNESS OF BREATH): ICD-10-CM

## 2020-09-11 DIAGNOSIS — R42 DIZZINESS: ICD-10-CM

## 2020-09-11 DIAGNOSIS — I25.10 CORONARY ARTERY DISEASE INVOLVING NATIVE CORONARY ARTERY OF NATIVE HEART WITHOUT ANGINA PECTORIS: ICD-10-CM

## 2020-09-11 LAB
A/G RATIO: 2 (ref 1.1–2.2)
ALBUMIN SERPL-MCNC: 4.3 G/DL (ref 3.4–5)
ALP BLD-CCNC: 50 U/L (ref 40–129)
ALT SERPL-CCNC: 21 U/L (ref 10–40)
ANION GAP SERPL CALCULATED.3IONS-SCNC: 9 MMOL/L (ref 3–16)
AST SERPL-CCNC: 22 U/L (ref 15–37)
BILIRUB SERPL-MCNC: 0.8 MG/DL (ref 0–1)
BUN BLDV-MCNC: 16 MG/DL (ref 7–20)
CALCIUM SERPL-MCNC: 9 MG/DL (ref 8.3–10.6)
CHLORIDE BLD-SCNC: 104 MMOL/L (ref 99–110)
CHOLESTEROL, TOTAL: 132 MG/DL (ref 0–199)
CO2: 27 MMOL/L (ref 21–32)
CREAT SERPL-MCNC: 0.6 MG/DL (ref 0.8–1.3)
GFR AFRICAN AMERICAN: >60
GFR NON-AFRICAN AMERICAN: >60
GLOBULIN: 2.2 G/DL
GLUCOSE BLD-MCNC: 115 MG/DL (ref 70–99)
HDLC SERPL-MCNC: 46 MG/DL (ref 40–60)
LDL CHOLESTEROL CALCULATED: 74 MG/DL
POTASSIUM SERPL-SCNC: 4.4 MMOL/L (ref 3.5–5.1)
SODIUM BLD-SCNC: 140 MMOL/L (ref 136–145)
TOTAL PROTEIN: 6.5 G/DL (ref 6.4–8.2)
TRIGL SERPL-MCNC: 58 MG/DL (ref 0–150)
TSH REFLEX: 3.62 UIU/ML (ref 0.27–4.2)
VLDLC SERPL CALC-MCNC: 12 MG/DL

## 2020-09-30 RX ORDER — LEVOTHYROXINE SODIUM 0.05 MG/1
TABLET ORAL
Qty: 30 TABLET | Refills: 0 | Status: SHIPPED | OUTPATIENT
Start: 2020-09-30 | End: 2020-10-30

## 2020-09-30 NOTE — TELEPHONE ENCOUNTER
Refill Request     Last Seen: Visit date not found    Last Written: 9/3/2020    Next Appointment:   Future Appointments   Date Time Provider Melissa Kimber   10/1/2020 10:00 AM MD KATHARINE Pierson [facility]       Appointment scheduled      Requested Prescriptions     Pending Prescriptions Disp Refills    levothyroxine (SYNTHROID) 50 MCG tablet [Pharmacy Med Name: LEVOTHYROXINE 50 MCG TABLET] 30 tablet 0     Sig: TAKE 1 TABLET BY MOUTH EVERY DAY

## 2020-10-01 ENCOUNTER — OFFICE VISIT (OUTPATIENT)
Dept: FAMILY MEDICINE CLINIC | Age: 70
End: 2020-10-01
Payer: MEDICARE

## 2020-10-01 VITALS
SYSTOLIC BLOOD PRESSURE: 118 MMHG | RESPIRATION RATE: 16 BRPM | DIASTOLIC BLOOD PRESSURE: 64 MMHG | TEMPERATURE: 97.2 F | BODY MASS INDEX: 27.31 KG/M2 | HEIGHT: 67 IN | HEART RATE: 80 BPM | OXYGEN SATURATION: 98 % | WEIGHT: 174 LBS

## 2020-10-01 PROBLEM — Z78.9 STATIN INTOLERANCE: Status: ACTIVE | Noted: 2020-10-01

## 2020-10-01 LAB — HBA1C MFR BLD: 5.8 %

## 2020-10-01 PROCEDURE — G0008 ADMIN INFLUENZA VIRUS VAC: HCPCS | Performed by: FAMILY MEDICINE

## 2020-10-01 PROCEDURE — 90694 VACC AIIV4 NO PRSRV 0.5ML IM: CPT | Performed by: FAMILY MEDICINE

## 2020-10-01 PROCEDURE — G0439 PPPS, SUBSEQ VISIT: HCPCS | Performed by: FAMILY MEDICINE

## 2020-10-01 PROCEDURE — 83036 HEMOGLOBIN GLYCOSYLATED A1C: CPT | Performed by: FAMILY MEDICINE

## 2020-10-01 RX ORDER — ZOSTER VACCINE RECOMBINANT, ADJUVANTED 50 MCG/0.5
0.5 KIT INTRAMUSCULAR SEE ADMIN INSTRUCTIONS
Qty: 0.5 ML | Refills: 1 | Status: ON HOLD | OUTPATIENT
Start: 2020-10-01 | End: 2020-11-17

## 2020-10-01 ASSESSMENT — LIFESTYLE VARIABLES
HOW MANY STANDARD DRINKS CONTAINING ALCOHOL DO YOU HAVE ON A TYPICAL DAY: 0
HOW OFTEN DURING THE LAST YEAR HAVE YOU BEEN UNABLE TO REMEMBER WHAT HAPPENED THE NIGHT BEFORE BECAUSE YOU HAD BEEN DRINKING: 0
HOW OFTEN DURING THE LAST YEAR HAVE YOU FOUND THAT YOU WERE NOT ABLE TO STOP DRINKING ONCE YOU HAD STARTED: 0
HAS A RELATIVE, FRIEND, DOCTOR, OR ANOTHER HEALTH PROFESSIONAL EXPRESSED CONCERN ABOUT YOUR DRINKING OR SUGGESTED YOU CUT DOWN: 0
HOW OFTEN DO YOU HAVE A DRINK CONTAINING ALCOHOL: 1
HOW OFTEN DURING THE LAST YEAR HAVE YOU HAD A FEELING OF GUILT OR REMORSE AFTER DRINKING: 0
AUDIT TOTAL SCORE: 1
HOW OFTEN DO YOU HAVE SIX OR MORE DRINKS ON ONE OCCASION: 0
HAVE YOU OR SOMEONE ELSE BEEN INJURED AS A RESULT OF YOUR DRINKING: 0
HOW OFTEN DURING THE LAST YEAR HAVE YOU NEEDED AN ALCOHOLIC DRINK FIRST THING IN THE MORNING TO GET YOURSELF GOING AFTER A NIGHT OF HEAVY DRINKING: 0
HOW OFTEN DURING THE LAST YEAR HAVE YOU FAILED TO DO WHAT WAS NORMALLY EXPECTED FROM YOU BECAUSE OF DRINKING: 0
AUDIT-C TOTAL SCORE: 1

## 2020-10-01 ASSESSMENT — PATIENT HEALTH QUESTIONNAIRE - PHQ9
SUM OF ALL RESPONSES TO PHQ9 QUESTIONS 1 & 2: 0
2. FEELING DOWN, DEPRESSED OR HOPELESS: 0
SUM OF ALL RESPONSES TO PHQ QUESTIONS 1-9: 0
1. LITTLE INTEREST OR PLEASURE IN DOING THINGS: 0
SUM OF ALL RESPONSES TO PHQ QUESTIONS 1-9: 0

## 2020-10-01 NOTE — PROGRESS NOTES
Vaccine Information Sheet, \"Influenza - Inactivated\"  given to Mansi Mejia, or parent/legal guardian of  Mansi Mejia and verbalized understanding. Patient responses:    Have you ever had a reaction to a flu vaccine? No  Do you have any current illness? No  Have you ever had Guillian [place] Syndrome? No  Do you have a serious allergy to any of the follow: Neomycin, Polymyxin, Thimerosal, eggs or egg products? No    Flu vaccine given per order. Please see immunization tab. Risks and benefits explained. Current VIS given.       Immunizations Administered     Name Date Dose Route    Influenza, Quadv, adjuvanted, 65 yrs +, IM, PF (Fluad) 10/1/2020 0.5 mL Intramuscular    Site: Deltoid- Right    Lot: [id number]    NDC: 94440-811-44

## 2020-10-01 NOTE — PATIENT INSTRUCTIONS
Personalized Preventive Plan for Kellen Cousins - 10/1/2020  Medicare offers a range of preventive health benefits. Some of the tests and screenings are paid in full while other may be subject to a deductible, co-insurance, and/or copay. Some of these benefits include a comprehensive review of your medical history including lifestyle, illnesses that may run in your family, and various assessments and screenings as appropriate. After reviewing your medical record and screening and assessments performed today your provider may have ordered immunizations, labs, imaging, and/or referrals for you. A list of these orders (if applicable) as well as your Preventive Care list are included within your After Visit Summary for your review. Other Preventive Recommendations:    · A preventive eye exam performed by an eye specialist is recommended every 1-2 years to screen for glaucoma; cataracts, macular degeneration, and other eye disorders. · A preventive dental visit is recommended every 6 months. · Try to get at least 150 minutes of exercise per week or 10,000 steps per day on a pedometer . · Order or download the FREE \"Exercise & Physical Activity: Your Everyday Guide\" from [employer]. Call 4-244.470.7203 or search [employer] online. · You need 3254-7267 mg of calcium and 8231-2608 IU of vitamin D per day. It is possible to meet your calcium requirement with diet alone, but a vitamin D supplement is usually necessary to meet this goal.  · When exposed to the sun, use a sunscreen that protects against both UVA and UVB radiation with an SPF of 30 or greater. Reapply every 2 to 3 hours or after sweating, drying off with a towel, or swimming. · Always wear a seat belt when traveling in a car. Always wear a helmet when riding a bicycle or motorcycle.

## 2020-10-01 NOTE — PROGRESS NOTES
Medicare Annual Wellness Visit  Name: Lucy Alonzo Date: 10/1/2020   MRN: <[id number]> Sex: Male   Age: 72 y.o. Ethnicity: Non-/Non    : 1950 Race: Nura Hansen is here for Medicare AWV    Screenings for behavioral, psychosocial and functional/safety risks, and cognitive dysfunction are all negative except as indicated below. These results, as well as other patient data from the [address] form, are documented in Flowsheets linked to this Encounter. Allergies   Allergen Reactions    Penicillins Shortness Of Breath    Statins Other (See Comments)     Muscle pain       Prior to Visit Medications    Medication Sig Taking? Authorizing Provider   levothyroxine (SYNTHROID) 50 MCG tablet TAKE 1 TABLET BY MOUTH EVERY DAY Yes Liz Jacome MD   simvastatin (ZOCOR) 20 MG tablet Take 1 tablet by mouth nightly Yes Thai Adrian MD   clopidogrel (PLAVIX) 75 MG tablet Take 1 tablet by mouth daily Yes Thai Adrian MD   nitroGLYCERIN (NITROSTAT) 0.4 MG SL tablet Place 1 tablet under the tongue every 5 minutes as needed for Chest pain up to max of 3 total doses. If no relief after 1 dose, call 911. Yes Thai Adrian MD   Omega-3 Fatty Acids (OMEGA-3 FISH OIL) 1000 MG CAPS Take by mouth Yes Historical Provider, MD   aspirin 81 MG tablet Take 81 mg by mouth daily. Yes Historical Provider, MD   vitamin D (CHOLECALCIFEROL) 1000 UNIT TABS tablet Take 1,000 Units by mouth daily.  Yes Historical Provider, MD   zoster recombinant adjuvanted vaccine [facility]) 50 MCG/0.5ML SUSR injection Inject 0.5 mLs into the muscle See Admin Instructions 1 dose now and repeat in 2-6 months  Patient not taking: Reported on 10/1/2020  Liz Jacome MD       Past Medical History:   Diagnosis Date    Amnesia 2012    Arthritis 3/31/2011    Benign positional vertigo 3/31/2011    CAD (coronary artery disease)     Carotid artery stenosis 2014    Heart murmur 1970    HTN (hypertension) 3/31/2011    Hyperlipidemia     Hypothyroid 3/2011    Neck pain 3/31/2011    S/P cardiac cath        Past Surgical History:   Procedure Laterality Date    COLONOSCOPY      CORONARY ANGIOPLASTY WITH STENT PLACEMENT  03/25/2015    DESx1 mLAD xience alpine 3x8    DIAGNOSTIC CARDIAC CATH LAB PROCEDURE      ENDOSCOPY, COLON, DIAGNOSTIC      EYE SURGERY      calcium deposits removed    HEMORRHOID SURGERY  1988    HERNIA REPAIR      TONSILLECTOMY      TUMOR REMOVAL      left side       Family History   Problem Relation Age of Onset    Arthritis Mother     Heart Disease Father     Cancer Sister        CareTeam (Including outside providers/suppliers regularly involved in providing care):   Patient Care Team:  Mariano Alex MD as PCP - Joann Souza MD as PCP - [facility] Empaneled Provider  Lainey Miner MD as Consulting Physician (Cardiology)  Lan Rodrigues MD as Consulting Physician (Pulmonology)    Wt Readings from Last 3 Encounters:   10/01/20 174 lb (78.9 kg)   09/12/19 173 lb (78.5 kg)   03/11/19 185 lb (83.9 kg)     Vitals:    10/01/20 1004   BP: 118/64   Site: Right Upper Arm   Position: Sitting   Cuff Size: Medium Adult   Pulse: 80   Resp: 16   Temp: 97.2 °F (36.2 °C)   TempSrc: Temporal   SpO2: 98%   Weight: 174 lb (78.9 kg)   Height: 5' 7\" (1.702 m)     Body mass index is 27.25 kg/m². Based upon direct observation of the patient, evaluation of cognition reveals recent and remote memory intact.     General Appearance: alert and oriented to person, place and time, well developed and well- nourished, in no acute distress  Skin: warm and dry, no rash or erythema  Head: normocephalic and atraumatic  Eyes: pupils equal, round, and reactive to light, extraocular eye movements intact, conjunctivae normal  ENT: tympanic membrane, external ear and ear canal normal bilaterally, nose without deformity, nasal mucosa and turbinates normal without polyps  Neck: supple and non-tender without mass, no thyromegaly or thyroid nodules, no cervical lymphadenopathy  Pulmonary/Chest: clear to auscultation bilaterally- no wheezes, rales or rhonchi, normal air movement, no respiratory distress  Cardiovascular: normal rate, regular rhythm, normal S1 and S2, no murmurs, rubs, clicks, or gallops, distal pulses intact, no carotid bruits  Abdomen: soft, non-tender, non-distended, normal bowel sounds, no masses or organomegaly  Extremities: no cyanosis, clubbing or edema  Musculoskeletal: normal range of motion, no joint swelling, deformity or tenderness  Neurologic: reflexes normal and symmetric, no cranial nerve deficit, gait, coordination and speech normal    Patient's complete Health Risk Assessment and screening values have been reviewed and are found in Flowsheets. The following problems were reviewed today and where indicated follow up appointments were made and/or referrals ordered.     Positive Risk Factor Screenings with Interventions:     Hearing/Vision:  No exam data present  Hearing/Vision  Do you or your family notice any trouble with your hearing?: (!) Yes(has aide)  Do you have difficulty driving, watching TV, or doing any of your daily activities because of your eyesight?: No  Have you had an eye exam within the past year?: (!) No  Hearing/Vision Interventions:  · Hearing concerns:  patient declines any further evaluation/treatment for hearing issues  · Vision concerns:  patient encouraged to make appointment with his/her eye specialist    Safety:  Safety  Do you have working smoke detectors?: Yes  Have all throw rugs been removed or fastened?: Yes  Do you have non-slip mats or surfaces in all bathtubs/showers?: (!) No  Do all of your stairways have a railing or banister?: Yes  Are your doorways, halls and stairs free of clutter?: Yes  Do you always fasten your seatbelt when you are in a car?: Yes  Safety Interventions:  · Home safety tips provided    Personalized Preventive Plan   Current

## 2020-10-14 LAB — FECAL BLOOD IMMUNOCHEMICAL TEST: POSITIVE

## 2020-10-28 ENCOUNTER — TELEPHONE (OUTPATIENT)
Dept: FAMILY MEDICINE CLINIC | Age: 70
End: 2020-10-28

## 2020-10-28 NOTE — TELEPHONE ENCOUNTER
Pt called because he is wondering what  [facility] thinks of the results of pts Fit test that he brought in on 10/26.  He is asking if Dr. CARREROSDEN [facility] could take a look at them and give him a call back at [phone]

## 2020-10-28 NOTE — TELEPHONE ENCOUNTER
Test was positive for blood. I highly recommend having a colonoscopy to determine the source of bleeding and rule out colon cancer. I will put in a referral for him to GI if he is ok with moving forward with further testing.

## 2020-11-03 ENCOUNTER — OFFICE VISIT (OUTPATIENT)
Dept: GASTROENTEROLOGY | Age: 70
End: 2020-11-03
Payer: MEDICARE

## 2020-11-03 VITALS
HEIGHT: 67 IN | BODY MASS INDEX: 27.31 KG/M2 | WEIGHT: 174 LBS | SYSTOLIC BLOOD PRESSURE: 140 MMHG | TEMPERATURE: 97.8 F | DIASTOLIC BLOOD PRESSURE: 70 MMHG

## 2020-11-03 PROCEDURE — 99202 OFFICE O/P NEW SF 15 MIN: CPT | Performed by: INTERNAL MEDICINE

## 2020-11-03 RX ORDER — BISACODYL 5 MG
TABLET, DELAYED RELEASE (ENTERIC COATED) ORAL
Qty: 4 TABLET | Refills: 0 | Status: ON HOLD | OUTPATIENT
Start: 2020-11-03 | End: 2020-11-17 | Stop reason: ALTCHOICE

## 2020-11-03 RX ORDER — POLYETHYLENE GLYCOL 3350, SODIUM SULFATE ANHYDROUS, SODIUM BICARBONATE, SODIUM CHLORIDE, POTASSIUM CHLORIDE 236; 22.74; 6.74; 5.86; 2.97 G/4L; G/4L; G/4L; G/4L; G/4L
4 POWDER, FOR SOLUTION ORAL ONCE
Qty: 4000 ML | Refills: 0 | Status: SHIPPED | OUTPATIENT
Start: 2020-11-03 | End: 2020-11-03

## 2020-11-03 NOTE — PROGRESS NOTES
[address] [facility],  [address] [place] [address] Ave  [place], [address] [place] [address]  Phone: [address] [facility] He is a  [2] White [1] 72 y.o. . male      CHIEF COMPLAINT   The patient is here for     Chief Complaint   Patient presents with    New Patient     np positive fit test        HPI    The patient has had bleeding per rectum on and off for many years. It is bright red bleeding usually on the tissue, but he has seen some blood mixed in with stool. He has been advised repeatedly by his PCP to have a colonoscopy. However, the patient wanted to put it off as he did not like the idea. He finally had a Cologuard test done and it turned out to be positive. That has led to this visit. Other than the above symptoms, the patient does not report any GI symptoms. He has no weight loss anorexia. His last stents were placed about 4 years ago. He has been on Plavix. He has no chest pain or shortness of breath. He has been active physically and has not had any issues with heart. He is seeing Dr. Magdalena Palacios for his heart disease.       PAST MEDICAL HISTORY     Past Medical History:   Diagnosis Date    Amnesia 9/1/2012    Arthritis 3/31/2011    Benign positional vertigo 3/31/2011    CAD (coronary artery disease)     Carotid artery stenosis 8/18/2014    Heart murmur 1970    HTN (hypertension) 3/31/2011    Hyperlipidemia     Hypothyroid 3/2011    Neck pain 3/31/2011    S/P cardiac cath      FAMILY HISTORY     Family History   Problem Relation Age of Onset    Arthritis Mother     Heart Disease Father     Cancer Sister      SOCIAL HISTORY     Social History     Socioeconomic History    Marital status:      Spouse name: Not on file    Number of children: Not on file    Years of education: Not on file    Highest education level: Not on file   Occupational History    Not on file   Social Needs    Financial resource strain: Not on file    Food insecurity Worry: Not on file     Inability: Not on file    Transportation needs     Medical: Not on file     Non-medical: Not on file   Tobacco Use    Smoking status: Never Smoker    Smokeless tobacco: Never Used   Substance and Sexual Activity    Alcohol use:  Yes     Alcohol/week: 1.0 standard drinks     Types: 1 Cans of beer per week     Comment: occasionally     Drug use: No    Sexual activity: Yes     Partners: Female   Lifestyle    Physical activity     Days per week: Not on file     Minutes per session: Not on file    Stress: Not on file   Relationships    Social connections     Talks on phone: Not on file     Gets together: Not on file     Attends Druze service: Not on file     Active member of club or organization: Not on file     Attends meetings of clubs or organizations: Not on file     Relationship status: Not on file    Intimate partner violence     Fear of current or ex partner: Not on file     Emotionally abused: Not on file     Physically abused: Not on file     Forced sexual activity: Not on file   Other Topics Concern    Not on file   Social History Narrative    Not on file     SURGICAL HISTORY     Past Surgical History:   Procedure Laterality Date    COLONOSCOPY      CORONARY ANGIOPLASTY WITH STENT PLACEMENT  03/25/2015    DESx1 mLAD xience alpine 3x8    DIAGNOSTIC CARDIAC CATH LAB PROCEDURE      ENDOSCOPY, COLON, DIAGNOSTIC      EYE SURGERY      calcium deposits removed    HEMORRHOID SURGERY  1988    HERNIA REPAIR      TONSILLECTOMY      TUMOR REMOVAL      left side     CURRENT MEDICATIONS   (This list may include medications prescribed during this encounter as epic can not insert only the list prior to this encounter.)  Current Outpatient Rx   Medication Sig Dispense Refill    levothyroxine (SYNTHROID) 50 MCG tablet TAKE 1 TABLET BY MOUTH EVERY DAY 90 tablet 3    zoster recombinant adjuvanted vaccine (SHINGRIX) 50 MCG/0.5ML SUSR injection Inject 0.5 mLs into the muscle See Admin Instructions 1 dose now and repeat in 2-6 months 0.5 mL 1    simvastatin (ZOCOR) 20 MG tablet Take 1 tablet by mouth nightly 90 tablet 3    clopidogrel (PLAVIX) 75 MG tablet Take 1 tablet by mouth daily 90 tablet 3    nitroGLYCERIN (NITROSTAT) 0.4 MG SL tablet Place 1 tablet under the tongue every 5 minutes as needed for Chest pain up to max of 3 total doses. If no relief after 1 dose, call 911. 25 tablet 3    Omega-3 Fatty Acids (OMEGA-3 FISH OIL) 1000 MG CAPS Take by mouth      aspirin 81 MG tablet Take 81 mg by mouth daily.  vitamin D (CHOLECALCIFEROL) 1000 UNIT TABS tablet Take 1,000 Units by mouth daily.  zoster recombinant adjuvanted vaccine [facility]) 50 MCG/0.5ML SUSR injection Inject 0.5 mLs into the muscle See Admin Instructions 1 dose now and repeat in 2-6 months (Patient not taking: Reported on 11/3/2020) 1 each 1       ALLERGIES     Allergies   Allergen Reactions    Penicillins Shortness Of Breath    Statins Other (See Comments)     Muscle pain       REVIEW OF SYSTEMS   No chest pain suspicious for cardiac origin  No SOB    PHYSICAL EXAM   VITAL SIGNS: BP (!) 140/70   Temp 97.8 °F (36.6 °C)   Ht 5' 7\" (1.702 m)   Wt 174 lb (78.9 kg)   BMI 27.25 kg/m²   Wt Readings from Last 1 Encounters:   11/03/20 174 lb (78.9 kg)     Constitutional: Well developed, Well nourished, No acute distress, Non-toxic appearance. Heart: WNL  Lungs: Clear to A&P  Abd: soft, non-tender, no masses are felt. Neurologic: Alert & oriented x 3. Psych: Good mood and memory. Pt is able to make appropriate decisions. FINAL IMPRESSION AND RECOMMENDATIONS     The bleeding per rectum could be related to hemorrhoids. However, with the history of blood mixed in with the stool, the possibility of a colonic lesion cannot be ruled out. The patient is advised to have a colonoscopy. The patient has been explained the risks and benefits of colonoscopy with biopsy, polypectomy and other interventions as needed. The risks explained to the patient included, but were not limited to, bleeding, perforation, infection, suppression of breathing, cardiac arrhythmias, heart attack, stroke, need for surgery or hospitalization and remotely even death. The alternatives to colonoscopy including CT colonography were discussed with pros and cons. The patient is explained that compared to other alternatives to colonoscopy for evaluation of the colon, colonoscopy was the most accurate test as long as the preparation is adequate. The patient is also explained that in some cases colonoscopy cannot be completed because of a variety of reasons including poor prep. The prognosis in case colonoscopy is not done was also explained. The complication rate is 0.3 to 1.5% in general when minor and major complications are put together. The prognosis is explained if the procedure is not done. The medical condition(s) which increase the risks of the procedure as explained to the patient are: heart disease,  And need to stop Plavix after stent were placed four years ago. The alternative to the above procedure discussed with the patient: CT colonography    The patient is informed that no test is 100% accurate and there is always a possibility of missing a diagnosis even with an endoscopic test which is considered more accurate than the alternatives of radiologic tests. The overall complication rate with this procedure is still low enough in this case, in my opinion, that risk to benefit ratio is acceptable, but the final decision is that of the patient. This was explained to the paitent. The patient voiced understanding of what was discussed. The patient was given opportunity to ask questions. No question was left unanswered. The patient expressed the desire to go ahead with the colonoscopy and gave informed consent saying, 'If you think colonoscopy is best for me, then I will go ahead with the colonoscopy'.         ORDERED FUTURE/PENDING TESTS     Lab Frequency Next Occurrence       FOLLOWUP   No follow-ups on file. The total time spent face-to-face during this visit was 20 minutes with more than 50% of the time spent in coordination of care and counseling the patient about the medical conditions and their management. Shanti Mota 11/3/20 9:28 AM EST    CC:  Katie Anglin MD      IMPORTANT: Please note that some portions of this note may have been created using Dragon voice recognition software. Some \"sound-alike\" and totally wrong word substitutions may have taken place due to known inherent limitations of any such software, including this voice recognition software. In spite of efforts to eliminate such errors, some may not have been corrected. So please read the note with this in mind and recognize such mistakes and understand the correct version using the  context. Thanks.

## 2020-11-03 NOTE — TELEPHONE ENCOUNTER
Called and spoke to the pharmacy to see if they had the golytely solution in yet they have [id number] electrolytes they are changing it to because it is an equivalent

## 2020-11-11 ENCOUNTER — HOSPITAL ENCOUNTER (OUTPATIENT)
Age: 70
Discharge: HOME | End: 2020-11-11
Payer: MEDICARE

## 2020-11-11 PROCEDURE — U0003 INFECTIOUS AGENT DETECTION BY NUCLEIC ACID (DNA OR RNA); SEVERE ACUTE RESPIRATORY SYNDROME CORONAVIRUS 2 (SARS-COV-2) (CORONAVIRUS DISEASE [COVID-19]), AMPLIFIED PROBE TECHNIQUE, MAKING USE OF HIGH THROUGHPUT TECHNOLOGIES AS DESCRIBED BY CMS-2020-01-R: HCPCS

## 2020-11-12 LAB — SARS-COV-2: NOT DETECTED

## 2020-11-16 ENCOUNTER — ANESTHESIA EVENT (OUTPATIENT)
Dept: ENDOSCOPY | Age: 70
End: 2020-11-16
Payer: MEDICARE

## 2020-11-16 NOTE — PROGRESS NOTES
PAT Call placed. Message left in regards to Endoscopy procedure on 11/17/2020 to arrive at 1200. Instructions reviewed - NPO after am dose prep completed , pt to call physician office if any difficulty with prep for procedure, phone number provided.

## 2020-11-17 ENCOUNTER — ANESTHESIA (OUTPATIENT)
Dept: ENDOSCOPY | Age: 70
End: 2020-11-17
Payer: MEDICARE

## 2020-11-17 ENCOUNTER — HOSPITAL ENCOUNTER (OUTPATIENT)
Age: 70
Setting detail: OUTPATIENT SURGERY
Discharge: HOME | End: 2020-11-17
Attending: INTERNAL MEDICINE | Admitting: INTERNAL MEDICINE
Payer: MEDICARE

## 2020-11-17 VITALS
SYSTOLIC BLOOD PRESSURE: 107 MMHG | HEIGHT: 67 IN | DIASTOLIC BLOOD PRESSURE: 86 MMHG | RESPIRATION RATE: 18 BRPM | HEART RATE: 53 BPM | OXYGEN SATURATION: 94 % | WEIGHT: 171 LBS | TEMPERATURE: 97.2 F | BODY MASS INDEX: 26.84 KG/M2

## 2020-11-17 VITALS
DIASTOLIC BLOOD PRESSURE: 58 MMHG | OXYGEN SATURATION: 98 % | RESPIRATION RATE: 15 BRPM | SYSTOLIC BLOOD PRESSURE: 102 MMHG

## 2020-11-17 PROCEDURE — 6360000002 HC RX W HCPCS: Performed by: NURSE ANESTHETIST, CERTIFIED REGISTERED

## 2020-11-17 PROCEDURE — 3609019800 HC COLONOSCOPY WITH SUBMUCOSAL INJECTION: Performed by: INTERNAL MEDICINE

## 2020-11-17 PROCEDURE — 2500000003 HC RX 250 WO HCPCS: Performed by: NURSE ANESTHETIST, CERTIFIED REGISTERED

## 2020-11-17 PROCEDURE — 3700000000 HC ANESTHESIA ATTENDED CARE: Performed by: INTERNAL MEDICINE

## 2020-11-17 PROCEDURE — 3700000001 HC ADD 15 MINUTES (ANESTHESIA): Performed by: INTERNAL MEDICINE

## 2020-11-17 PROCEDURE — 2720000010 HC SURG SUPPLY STERILE: Performed by: INTERNAL MEDICINE

## 2020-11-17 PROCEDURE — 2709999900 HC NON-CHARGEABLE SUPPLY: Performed by: INTERNAL MEDICINE

## 2020-11-17 PROCEDURE — 3609010600 HC COLONOSCOPY POLYPECTOMY SNARE/COLD BIOPSY: Performed by: INTERNAL MEDICINE

## 2020-11-17 PROCEDURE — 7100000011 HC PHASE II RECOVERY - ADDTL 15 MIN: Performed by: INTERNAL MEDICINE

## 2020-11-17 PROCEDURE — 88305 TISSUE EXAM BY PATHOLOGIST: CPT

## 2020-11-17 PROCEDURE — 2580000003 HC RX 258: Performed by: ANESTHESIOLOGY

## 2020-11-17 PROCEDURE — 7100000010 HC PHASE II RECOVERY - FIRST 15 MIN: Performed by: INTERNAL MEDICINE

## 2020-11-17 PROCEDURE — 3609009900 HC COLONOSCOPY W/CONTROL BLEEDING ANY METHOD: Performed by: INTERNAL MEDICINE

## 2020-11-17 RX ORDER — PROPOFOL 10 MG/ML
INJECTION, EMULSION INTRAVENOUS PRN
Status: DISCONTINUED | OUTPATIENT
Start: 2020-11-17 | End: 2020-11-17 | Stop reason: SDUPTHER

## 2020-11-17 RX ORDER — SODIUM CHLORIDE, SODIUM LACTATE, POTASSIUM CHLORIDE, CALCIUM CHLORIDE 600; 310; 30; 20 MG/100ML; MG/100ML; MG/100ML; MG/100ML
INJECTION, SOLUTION INTRAVENOUS CONTINUOUS
Status: DISCONTINUED | OUTPATIENT
Start: 2020-11-17 | End: 2020-11-17 | Stop reason: HOSPADM

## 2020-11-17 RX ORDER — DIPHENHYDRAMINE HYDROCHLORIDE 50 MG/ML
12.5 INJECTION INTRAMUSCULAR; INTRAVENOUS
Status: DISCONTINUED | OUTPATIENT
Start: 2020-11-17 | End: 2020-11-17 | Stop reason: HOSPADM

## 2020-11-17 RX ORDER — MORPHINE SULFATE 10 MG/ML
2 INJECTION, SOLUTION INTRAMUSCULAR; INTRAVENOUS EVERY 5 MIN PRN
Status: DISCONTINUED | OUTPATIENT
Start: 2020-11-17 | End: 2020-11-17 | Stop reason: HOSPADM

## 2020-11-17 RX ORDER — ONDANSETRON 2 MG/ML
4 INJECTION INTRAMUSCULAR; INTRAVENOUS PRN
Status: DISCONTINUED | OUTPATIENT
Start: 2020-11-17 | End: 2020-11-17 | Stop reason: HOSPADM

## 2020-11-17 RX ORDER — MORPHINE SULFATE 10 MG/ML
1 INJECTION, SOLUTION INTRAMUSCULAR; INTRAVENOUS EVERY 5 MIN PRN
Status: DISCONTINUED | OUTPATIENT
Start: 2020-11-17 | End: 2020-11-17 | Stop reason: HOSPADM

## 2020-11-17 RX ORDER — OXYCODONE HYDROCHLORIDE AND ACETAMINOPHEN 5; 325 MG/1; MG/1
1 TABLET ORAL PRN
Status: DISCONTINUED | OUTPATIENT
Start: 2020-11-17 | End: 2020-11-17 | Stop reason: HOSPADM

## 2020-11-17 RX ORDER — MEPERIDINE HYDROCHLORIDE 25 MG/ML
12.5 INJECTION INTRAMUSCULAR; INTRAVENOUS; SUBCUTANEOUS EVERY 5 MIN PRN
Status: DISCONTINUED | OUTPATIENT
Start: 2020-11-17 | End: 2020-11-17 | Stop reason: HOSPADM

## 2020-11-17 RX ORDER — HYDRALAZINE HYDROCHLORIDE 20 MG/ML
5 INJECTION INTRAMUSCULAR; INTRAVENOUS EVERY 10 MIN PRN
Status: DISCONTINUED | OUTPATIENT
Start: 2020-11-17 | End: 2020-11-17 | Stop reason: HOSPADM

## 2020-11-17 RX ORDER — OXYCODONE HYDROCHLORIDE AND ACETAMINOPHEN 5; 325 MG/1; MG/1
2 TABLET ORAL PRN
Status: DISCONTINUED | OUTPATIENT
Start: 2020-11-17 | End: 2020-11-17 | Stop reason: HOSPADM

## 2020-11-17 RX ORDER — LABETALOL HYDROCHLORIDE 5 MG/ML
5 INJECTION, SOLUTION INTRAVENOUS EVERY 10 MIN PRN
Status: DISCONTINUED | OUTPATIENT
Start: 2020-11-17 | End: 2020-11-17 | Stop reason: HOSPADM

## 2020-11-17 RX ORDER — SODIUM CHLORIDE 0.9 % (FLUSH) 0.9 %
10 SYRINGE (ML) INJECTION EVERY 12 HOURS SCHEDULED
Status: DISCONTINUED | OUTPATIENT
Start: 2020-11-17 | End: 2020-11-17 | Stop reason: HOSPADM

## 2020-11-17 RX ORDER — SODIUM CHLORIDE 0.9 % (FLUSH) 0.9 %
10 SYRINGE (ML) INJECTION PRN
Status: DISCONTINUED | OUTPATIENT
Start: 2020-11-17 | End: 2020-11-17 | Stop reason: HOSPADM

## 2020-11-17 RX ORDER — LIDOCAINE HYDROCHLORIDE 20 MG/ML
INJECTION, SOLUTION INFILTRATION; PERINEURAL PRN
Status: DISCONTINUED | OUTPATIENT
Start: 2020-11-17 | End: 2020-11-17 | Stop reason: SDUPTHER

## 2020-11-17 RX ORDER — PROMETHAZINE HYDROCHLORIDE 25 MG/ML
6.25 INJECTION, SOLUTION INTRAMUSCULAR; INTRAVENOUS
Status: DISCONTINUED | OUTPATIENT
Start: 2020-11-17 | End: 2020-11-17 | Stop reason: HOSPADM

## 2020-11-17 RX ADMIN — PROPOFOL 70 MG: 10 INJECTION, EMULSION INTRAVENOUS at 13:07

## 2020-11-17 RX ADMIN — SODIUM CHLORIDE, POTASSIUM CHLORIDE, SODIUM LACTATE AND CALCIUM CHLORIDE: 600; 310; 30; 20 INJECTION, SOLUTION INTRAVENOUS at 12:37

## 2020-11-17 RX ADMIN — LIDOCAINE HYDROCHLORIDE 50 MG: 20 INJECTION, SOLUTION INFILTRATION; PERINEURAL at 12:53

## 2020-11-17 RX ADMIN — PROPOFOL 300 MG: 10 INJECTION, EMULSION INTRAVENOUS at 12:53

## 2020-11-17 ASSESSMENT — PAIN - FUNCTIONAL ASSESSMENT: PAIN_FUNCTIONAL_ASSESSMENT: 0-10

## 2020-11-17 ASSESSMENT — ENCOUNTER SYMPTOMS: SHORTNESS OF BREATH: 1

## 2020-11-17 ASSESSMENT — PAIN SCALES - GENERAL
PAINLEVEL_OUTOF10: 0
PAINLEVEL_OUTOF10: 0

## 2020-11-17 NOTE — H&P
Emotionally abused: Not on file     Physically abused: Not on file     Forced sexual activity: Not on file   Other Topics Concern    Not on file   Social History Narrative    Not on file       SURGICAL HISTORY     Past Surgical History:   Procedure Laterality Date    COLONOSCOPY      CORONARY ANGIOPLASTY WITH STENT PLACEMENT  03/25/2015    DESx1 mLAD xience alpine 3x8    DIAGNOSTIC CARDIAC CATH LAB PROCEDURE      ENDOSCOPY, COLON, DIAGNOSTIC      EYE SURGERY      calcium deposits removed    HEMORRHOID SURGERY  1988    HERNIA REPAIR      TONSILLECTOMY      TUMOR REMOVAL      left side     CURRENT MEDICATIONS   (This list may include medications prescribed during this encounter as epic can not insert only the list prior to this encounter.)      ALLERGIES     Allergies   Allergen Reactions    Penicillins Shortness Of Breath    Statins Other (See Comments)     Muscle pain         PHYSICAL EXAM   VITAL SIGNS: BP (!) 152/78   Pulse 61   Temp 96.9 °F (36.1 °C) (Temporal)   Resp 16   Ht 5' 7\" (1.702 m)   Wt 171 lb (77.6 kg)   SpO2 96%   BMI 26.78 kg/m²   Wt Readings from Last 3 Encounters:   11/17/20 171 lb (77.6 kg)   11/03/20 174 lb (78.9 kg)   10/01/20 174 lb (78.9 kg)     Constitutional: Well developed, Well nourished, No acute distress, Non-toxic appearance. Heart: WNL  Lungs: Clear to A&P  Abd: soft, non-tender, no masses are felt. Neurologic: Alert & oriented x 3. Psych: Good mood and memory. Pt is able to make appropriate decisions. Plan:   Colonoscopy with possible Bx or polypectomy or other interventions as necessary during the procedure. Recently, during the office visit, the patient was explained why the above procedure is needed and what is involved with the above procedure in simple words along with its need, risks, benefits and alternatives. The prognosis is explained.  The risks explained to the patient included, but were not limited to, bleeding, perforation, infection, suppression of breathing, heart attack, stroke, need for longer hospitalization and/or surgery and remotely even death. The patient voiced the understanding of the above and was given opportunity to ask questions. No question was left unanswered. The informed consent for the above procedure is obtained.

## 2020-11-17 NOTE — PROGRESS NOTES
Pt is alert and oriented, stable for discharge to home, iv out, cath intact, pressure and dressing applied, pt tolerated well. Surgical site dressings are clean, dry, intact. Pt and family received printed and verbal instructions for post op care at home, and they denied any further questions.  Pt taken out via wheelchair     Pain rate 0 out of 10    No new prescriptions for visit    Soft diet education given

## 2020-11-17 NOTE — ANESTHESIA PRE PROCEDURE
Hypothyroid E03.9    Neck pain M54.2    Arthritis M19.90    Essential hypertension I10    Benign positional vertigo H81.10    Carotid artery stenosis I65.29    Prediabetes R73.03    Coronary artery disease involving native coronary artery of native heart with unstable angina pectoris (HCC) I25.110    Angina, class III (HCC) I20.9    SOB (shortness of breath) R06.02    Dizziness R42    Statin intolerance Z78.9       Past Medical History:        Diagnosis Date    Amnesia 9/1/2012    Arthritis 3/31/2011    Benign positional vertigo 3/31/2011    CAD (coronary artery disease)     Carotid artery stenosis 8/18/2014    Heart murmur 1970    HTN (hypertension) 3/31/2011    Hyperlipidemia     Hypothyroid 3/2011    Neck pain 3/31/2011    S/P cardiac cath        Past Surgical History:        Procedure Laterality Date    COLONOSCOPY      CORONARY ANGIOPLASTY WITH STENT PLACEMENT  03/25/2015    DESx1 mLAD xience alpine 3x8    DIAGNOSTIC CARDIAC CATH LAB PROCEDURE      ENDOSCOPY, COLON, DIAGNOSTIC      EYE SURGERY      calcium deposits removed    HEMORRHOID SURGERY  1988    HERNIA REPAIR      TONSILLECTOMY      TUMOR REMOVAL      left side       Social History:    Social History     Tobacco Use    Smoking status: Never Smoker    Smokeless tobacco: Never Used   Substance Use Topics    Alcohol use:  Yes     Alcohol/week: 14.0 standard drinks     Types: 14 Cans of beer per week                                Counseling given: Not Answered      Vital Signs (Current):   Vitals:    11/17/20 1213   BP: (!) 152/78   Pulse: 61   Resp: 16   Temp: 96.9 °F (36.1 °C)   TempSrc: Temporal   SpO2: 96%   Weight: 171 lb (77.6 kg)   Height: 5' 7\" (1.702 m)                                              BP Readings from Last 3 Encounters:   11/17/20 (!) 152/78   11/03/20 (!) 140/70   10/01/20 118/64       NPO Status: Time of last liquid consumption: 0945                        Time of last solid consumption: 1900 Date of last liquid consumption: 11/17/20                        Date of last solid food consumption: 11/15/20    BMI:   Wt Readings from Last 3 Encounters:   11/17/20 171 lb (77.6 kg)   11/03/20 174 lb (78.9 kg)   10/01/20 174 lb (78.9 kg)     Body mass index is 26.78 kg/m². CBC:   Lab Results   Component Value Date    WBC 8.9 03/11/2019    RBC 4.73 03/11/2019    HGB 15.2 03/11/2019    HCT 45.1 03/11/2019    MCV 95.4 03/11/2019    RDW 13.4 03/11/2019     03/11/2019       CMP:   Lab Results   Component Value Date     09/11/2020    K 4.4 09/11/2020     09/11/2020    CO2 27 09/11/2020    BUN 16 09/11/2020    CREATININE 0.6 09/11/2020    GFRAA >60 09/11/2020    GFRAA >60 12/21/2012    AGRATIO 2.0 09/11/2020    LABGLOM >60 09/11/2020    GLUCOSE 115 09/11/2020    PROT 6.5 09/11/2020    PROT 7.8 12/21/2012    CALCIUM 9.0 09/11/2020    BILITOT 0.8 09/11/2020    ALKPHOS 50 09/11/2020    AST 22 09/11/2020    ALT 21 09/11/2020       POC Tests: No results for input(s): POCGLU, POCNA, POCK, POCCL, POCBUN, POCHEMO, POCHCT in the last 72 hours.     Coags:   Lab Results   Component Value Date    PROTIME 11.4 03/24/2015    INR 1.05 03/24/2015    APTT 30.7 08/31/2012       HCG (If Applicable): No results found for: PREGTESTUR, PREGSERUM, HCG, HCGQUANT     ABGs:   Lab Results   Component Value Date    PHART 7.447 09/01/2012    PO2ART 79.4 09/01/2012    CSR2ZWT 39.5 09/01/2012    JYJ2VME 26.7 09/01/2012    BEART 2.6 09/01/2012    Y1ETQNUU 96.1 09/01/2012        Type & Screen (If Applicable):  No results found for: LABABO, LABRH    Drug/Infectious Status (If Applicable):  No results found for: HIV, HEPCAB    COVID-19 Screening (If Applicable):   Lab Results   Component Value Date    COVID19 Not Detected 11/11/2020         Anesthesia Evaluation  Patient summary reviewed and Nursing notes reviewed  Airway: Mallampati: II  TM distance: >3 FB   Neck ROM: full  Mouth opening: > = 3 FB Dental: normal exam Pulmonary:normal exam  breath sounds clear to auscultation  (+) shortness of breath:                             Cardiovascular:    (+) hypertension:, angina:, CAD:, CABG/stent: no interval change, hyperlipidemia        Rhythm: regular  Rate: normal                    Neuro/Psych:   Negative Neuro/Psych ROS              GI/Hepatic/Renal: Neg GI/Hepatic/Renal ROS            Endo/Other:    (+) hypothyroidism::., .                 Abdominal:           Vascular: negative vascular ROS. Anesthesia Plan      MAC     ASA 3       Induction: intravenous. Anesthetic plan and risks discussed with patient. Plan discussed with CRNA.                   Nayeli Topete MD   11/17/2020

## 2020-11-18 NOTE — OP NOTE
Operative Note      Patient: Justo Jordan  YOB: 1950  MRN: [id number]    Date of Procedure: 2020    Pre-Op Diagnosis: BLOOD IN STOOL    Post-Op Diagnosis: Cecal polyp -removed. Diverticulosis. Hemorrhoids. Procedure: Colonoscopy with endoscopic mucosal resection. Surgeon(s):  Hoang Mendoza MD    Assistant:   * No surgical staff found *    Anesthesia: Monitor Anesthesia Care    Estimated Blood Loss (mL): Minimal    Complications: None    Specimens:   ID Type Source Tests Collected by Time Destination   A :  Tissue Tissue SURGICAL PATHOLOGY Hoang Mendoza MD 2020 1304        Implants:  * No implants in log *      Drains: * No LDAs found *    [address] [place],  [address] [facility]  Phone: [phone]  [address] [place] [address],  [address] [place] [address]  Phone: [phone]    Colonoscopy Procedure Note    Patient: Justo Jordan  : 1950    Procedure: Colonoscopy --diagnostic    Date:  2020     Endoscopist:  Hoang Mendoza MD    Referring Physician:  Mariano Alex MD    Anesthesia: Anesthesia: MAC    Procedure Details  The patient was placed in the left lateral decubitus position. The patient was monitored with ECG tracing, pulse oximetry, blood pressure monitoring, and direct observations. rectal examination was performed. There were small external hemorrhoids. The colonoscope was inserted through the anal canal into the rectum and advanced under to the cecum, which was identified by the ileocecal valve and appendiceal orifice examining the colon during insertion. The right colon was examined twice. The quality of the colonic preparation was fair. A careful inspection was made as the colonoscope was inserted and circumferentially when it was withdrawn. A retroflexed view of the rectum was obtained. Findings and interventions are described below.       Findings:     About 12 mm flat polyp was noted at the cecal entry. This was raised and hot snare polypectomy followed by closure of mucosal defect with two endoscopy clips was performed. Good hemostasis and closure were noted. A few diminutive hyperplastic-looking polyps were noted, but were not removed especially considering the polypectomy mentioned above and need to have a repeat colonoscopy. Early diverticulosis involving the sigmoid colon along with muscle hypertrophy  Small interno-external hemorrhoids    Complications and adverse events: None    The patient tolerated the procedure well. Disposition:   PACU - hemodynamically stable. Estimated Blood loss:  < 5 ml    Withdrawal Time: 21 minutes      Recommendations:  High fiber diet. Increase water intake. Await pathology report.         Electronically signed by Devante Stapleton MD on 11/18/2020 at 9:12 AM

## 2020-11-19 NOTE — ANESTHESIA POSTPROCEDURE EVALUATION
Department of Anesthesiology  Postprocedure Note    Patient: Colton Durand  MRN: [id number]  YOB: 1950  Date of evaluation: 11/19/2020  Time:  10:14 AM     Procedure Summary     Date:  11/17/20 Room / Location:  [place] [address] / [facility]    Anesthesia Start:  6717 Anesthesia Stop:  7325    Procedures:       COLON W/ANES. (13:00) (N/A )      COLONOSCOPY CONTROL HEMORRHAGE (N/A )      COLONOSCOPY SUBMUCOSAL/BOTOX INJECTION Diagnosis:       Blood in stool      (BLOOD IN STOOL)    Surgeon:  Barby Romero MD Responsible Provider:  Alycia Ellington MD    Anesthesia Type:  General ASA Status:  3          Anesthesia Type: General    Iram Phase I: Iram Score: 10    Iram Phase II: Iram Score: 10    Last vitals: Reviewed and per EMR flowsheets.        Anesthesia Post Evaluation    Patient location during evaluation: PACU  Patient participation: complete - patient participated  Level of consciousness: awake and alert  Pain score: 0  Airway patency: patent  Nausea & Vomiting: no nausea and no vomiting  Complications: no  Cardiovascular status: blood pressure returned to baseline  Respiratory status: acceptable  Hydration status: stable

## 2020-11-30 ENCOUNTER — OFFICE VISIT (OUTPATIENT)
Dept: GASTROENTEROLOGY | Age: 70
End: 2020-11-30
Payer: MEDICARE

## 2020-11-30 VITALS
SYSTOLIC BLOOD PRESSURE: 118 MMHG | HEIGHT: 67 IN | DIASTOLIC BLOOD PRESSURE: 64 MMHG | TEMPERATURE: 96.9 F | WEIGHT: 179 LBS | BODY MASS INDEX: 28.09 KG/M2

## 2020-11-30 PROCEDURE — 99212 OFFICE O/P EST SF 10 MIN: CPT | Performed by: INTERNAL MEDICINE

## 2020-11-30 NOTE — PROGRESS NOTES
[address] [facility],  [address] [place] [address] Ave  [place], [facility]  Phone: [phone] [facility] He is a  [2] White [1] 72 y.o. . male      Main Problems/Chief Complaint   12mm flat polyp at cecal entry removed by EMR. Path TA.    HPI    The patient has done well. NO new GI sx. PAST MEDICAL HISTORY     Past Medical History:   Diagnosis Date    Amnesia 9/1/2012    Arthritis 3/31/2011    Benign positional vertigo 3/31/2011    CAD (coronary artery disease)     Carotid artery stenosis 8/18/2014    Heart murmur 1970    HTN (hypertension) 3/31/2011    Hyperlipidemia     Hypothyroid 3/2011    Neck pain 3/31/2011    S/P cardiac cath     Tubular adenoma of colon      FAMILY HISTORY     Family History   Problem Relation Age of Onset    Arthritis Mother     Heart Disease Father     Cancer Sister      SOCIAL HISTORY     Social History     Socioeconomic History    Marital status:      Spouse name: Not on file    Number of children: Not on file    Years of education: Not on file    Highest education level: Not on file   Occupational History    Not on file   Social Needs    Financial resource strain: Not on file    Food insecurity     Worry: Not on file     Inability: Not on file    Transportation needs     Medical: Not on file     Non-medical: Not on file   Tobacco Use    Smoking status: Never Smoker    Smokeless tobacco: Never Used   Substance and Sexual Activity    Alcohol use:  Yes     Alcohol/week: 14.0 standard drinks     Types: 14 Cans of beer per week    Drug use: No    Sexual activity: Yes     Partners: Female   Lifestyle    Physical activity     Days per week: Not on file     Minutes per session: Not on file    Stress: Not on file   Relationships    Social connections     Talks on phone: Not on file     Gets together: Not on file     Attends Mandaeism service: Not on file     Active member of club or organization: Not on file     Attends meetings of clubs or organizations: Not on file     Relationship status: Not on file    Intimate partner violence     Fear of current or ex partner: Not on file     Emotionally abused: Not on file     Physically abused: Not on file     Forced sexual activity: Not on file   Other Topics Concern    Not on file   Social History Narrative    Not on file     SURGICAL HISTORY     Past Surgical History:   Procedure Laterality Date    COLONOSCOPY      COLONOSCOPY  11/17/2020    COLONOSCOPY SUBMUCOSAL/BOTOX INJECTION to control hemorrhage Dr Angel Kapoor 11/17/20    COLONOSCOPY N/A 11/17/2020    COLON W/ANES. (13:00) performed by Alexia Roman MD at [address] [facility] COLONOSCOPY N/A 11/17/2020    COLONOSCOPY CONTROL HEMORRHAGE performed by Alexia Roman MD at [address] [facility]  11/17/2020    COLONOSCOPY SUBMUCOSAL/BOTOX INJECTION performed by Alexia Roman MD at [address] [place] [address]  03/25/2015    DESx1 mLAD xience alpine 3x8    DIAGNOSTIC CARDIAC CATH LAB PROCEDURE      ENDOSCOPY, COLON, DIAGNOSTIC      EYE SURGERY      calcium deposits removed    HEMORRHOID SURGERY  1988    HERNIA REPAIR      TONSILLECTOMY      TUMOR REMOVAL      left side     CURRENT MEDICATIONS   (This list may include medications prescribed during this encounter as epic can not insert only the list prior to this encounter.)  Current Outpatient Rx   Medication Sig Dispense Refill    levothyroxine (SYNTHROID) 50 MCG tablet TAKE 1 TABLET BY MOUTH EVERY DAY 90 tablet 3    simvastatin (ZOCOR) 20 MG tablet Take 1 tablet by mouth nightly 90 tablet 3    clopidogrel (PLAVIX) 75 MG tablet Take 1 tablet by mouth daily 90 tablet 3    nitroGLYCERIN (NITROSTAT) 0.4 MG SL tablet Place 1 tablet under the tongue every 5 minutes as needed for Chest pain up to max of 3 total doses.  If no relief after 1 dose, call 911. 25 tablet 3    Omega-3 Fatty Acids (OMEGA-3 FISH OIL) 1000 MG CAPS Take by mouth      aspirin 81 MG tablet Take 81 mg by mouth daily.  vitamin D (CHOLECALCIFEROL) 1000 UNIT TABS tablet Take 1,000 Units by mouth daily. ALLERGIES     Allergies   Allergen Reactions    Penicillins Shortness Of Breath    Statins Other (See Comments)     Muscle pain       REVIEW OF SYSTEMS   No chest pain suspicious for cardiac origin  No SOB    PHYSICAL EXAM   VITAL SIGNS: /64 (Site: Right Upper Arm)   Temp 96.9 °F (36.1 °C)   Ht 5' 7\" (1.702 m)   Wt 179 lb (81.2 kg)   BMI 28.04 kg/m²   Wt Readings from Last 1 Encounters:   11/30/20 179 lb (81.2 kg)     Constitutional: Well developed, Well nourished, No acute distress, Non-toxic appearance. Heart: WNL  Lungs: Clear to A&P  Abd: soft, non-tender, no masses are felt. Neurologic: Alert & oriented x 3. Psych: Good mood and memory. Pt is able to make appropriate decisions. FINAL IMPRESSION AND RECOMMENDATIONS     The patient was explained that it is better for us to check the site of the polypectomy soon to make sure that no part of it is remaining. The patient is advised to have  Colonoscopy later part February or early March. The patient has been explained the risks and benefits of colonoscopy with biopsy, polypectomy and other interventions as needed. The risks explained to the patient included, but were not limited to, bleeding, perforation, infection, suppression of breathing, cardiac arrhythmias, heart attack, stroke, need for surgery or hospitalization and remotely even death. The alternatives to colonoscopy including CT colonography were discussed with pros and cons. The patient is explained that compared to other alternatives to colonoscopy for evaluation of the colon, colonoscopy was the most accurate test as long as the preparation is adequate. The patient is also explained that in some cases colonoscopy cannot be completed because of a variety of reasons including poor prep.   The prognosis in case the note with this in mind and recognize such mistakes and understand the correct version using the  context. Thanks.

## 2020-12-11 RX ORDER — SIMVASTATIN 20 MG
20 TABLET ORAL NIGHTLY
Qty: 90 TABLET | Refills: 1 | Status: SHIPPED | OUTPATIENT
Start: 2020-12-11 | End: 2021-02-03 | Stop reason: SDUPTHER

## 2020-12-11 RX ORDER — CLOPIDOGREL BISULFATE 75 MG/1
75 TABLET ORAL DAILY
Qty: 90 TABLET | Refills: 1 | Status: SHIPPED | OUTPATIENT
Start: 2020-12-11 | End: 2021-02-03 | Stop reason: SDUPTHER

## 2021-01-05 RX ORDER — NITROGLYCERIN 0.4 MG/1
0.4 TABLET SUBLINGUAL EVERY 5 MIN PRN
Qty: 25 TABLET | Refills: 3 | Status: SHIPPED | OUTPATIENT
Start: 2021-01-05 | End: 2021-02-03 | Stop reason: SDUPTHER

## 2021-02-01 NOTE — PROGRESS NOTES
[address] Office Note  2/1/2021     Subjective:  Mr. Joseph Aponte is here today for cardiology follow up of hyperlipidemia, CAD, HTN        Kiana:  Today he reports feeling      PMH  HTN, hyperlipidemia, hypothyroidism, murmur, who presented to the hospital with complaints of chest tightness. He reported feeling dizzy and lightheaded intermittently for a week. He had a brief episode of exertional chest tightness last week. He reported  walking up 2 flights of stairs he felt light headed and dizzy. After walking back down stairs he began to have mid sternal chest tightness/heaviness felt as if something was standing on his chest with radiation in to his jaws and pressure in his back of neck. He had associated SOB, nausea and continued to feel dizzy. [employer] He rested for a few minutes and symptoms improved, but moved 12 tires and symptoms increased. He was working and tried to ignore symptoms, but finally drove himself to ER for further evaluation. He reports his legs feeling weak and he slid down wall waiting in ER. In the ER his EKG revealed Sinus bradycardia Otherwise normal, troponin was negative x 2. He was administered SL nitro which resolved his symptoms He was transferred to [facility] and underwent LHC 3/25/15 with stent placement. He came back to hospital in early July 2016 with complaints of Chest pain and SOB. He ruled out for MI and was transferred to [facility] for LHC. Cardiac cath as reported below did not show any significant stenosis. Since then he has seen Dr Juarez Vincent and got treated with prednisone and significant improvement in SOB. He then reported muscle aches. Review of Systems:  12 point ROS negative in all areas as listed below except as in Kiana  Constitutional, EENT, Cardiovascular, pulmonary, GI, , Musculoskeletal, skin, neurological, hematological, endocrine, Psychiatric    Reviewed past medical history, social, and family history.    Non smoker, no alocohol  Family history  Non contributory Past Medical History:   Diagnosis Date    Amnesia 9/1/2012    Arthritis 3/31/2011    Benign positional vertigo 3/31/2011    CAD (coronary artery disease)     Carotid artery stenosis 8/18/2014    Heart murmur 1970    HTN (hypertension) 3/31/2011    Hyperlipidemia     Hypothyroid 3/2011    Neck pain 3/31/2011    S/P cardiac cath     Tubular adenoma of colon      Past Surgical History:   Procedure Laterality Date    COLONOSCOPY      COLONOSCOPY  11/17/2020    COLONOSCOPY SUBMUCOSAL/BOTOX INJECTION to control hemorrhage Dr Margaret Mclean 11/17/20    COLONOSCOPY N/A 11/17/2020    COLON W/ANES. (13:00) performed by Joshua Lundberg MD at [address] [facility] COLONOSCOPY N/A 11/17/2020    COLONOSCOPY CONTROL HEMORRHAGE performed by Joshua Lundberg MD at [address] [facility]  11/17/2020    COLONOSCOPY SUBMUCOSAL/BOTOX INJECTION performed by Joshua Lundberg MD at [address] [place] [address]  03/25/2015    DESx1 mLAD xience alpine 3x8    DIAGNOSTIC CARDIAC CATH LAB PROCEDURE      ENDOSCOPY, COLON, DIAGNOSTIC      EYE SURGERY      calcium deposits removed    HEMORRHOID SURGERY  1988    HERNIA REPAIR      TONSILLECTOMY      TUMOR REMOVAL      left side       Objective: There were no vitals taken for this visit. Wt Readings from Last 3 Encounters:   11/30/20 179 lb (81.2 kg)   11/17/20 171 lb (77.6 kg)   11/03/20 174 lb (78.9 kg)       Physical Exam:  General: No Respiratory distress, appears well developed and well nourished. Eyes:  Sclera nonicteric  Nose/Sinuses:  negative findings: nose shows no deformity, asymmetry, or inflammation, nasal mucosa normal, septum midline with no perforation or bleeding  Back:  no pain to palpation  Joint:  no active joint inflammation  Musculoskeletal:  negative  Skin:  Warm and dry  Neck:  Negative for JVD and Carotid Bruits.    Chest:  Clear to auscultation, respiration easy Cardiovascular:  RRR,  S1S2 normal, No murmur , no rub or thrill. Abdomen:  Soft normal liver and spleen  Extremities:   No edema, clubbing, cyanosis,  Pulses: Femoral and pedal pulses are normal.Neuro: intact    Medications:   Outpatient Encounter Medications as of 2/3/2021   Medication Sig Dispense Refill    nitroGLYCERIN (NITROSTAT) 0.4 MG SL tablet Place 1 tablet under the tongue every 5 minutes as needed for Chest pain up to max of 3 total doses. If no relief after 1 dose, call 911. 25 tablet 3    simvastatin (ZOCOR) 20 MG tablet Take 1 tablet by mouth nightly 90 tablet 1    clopidogrel (PLAVIX) 75 MG tablet Take 1 tablet by mouth daily 90 tablet 1    levothyroxine (SYNTHROID) 50 MCG tablet TAKE 1 TABLET BY MOUTH EVERY DAY 90 tablet 3    Omega-3 Fatty Acids (OMEGA-3 FISH OIL) 1000 MG CAPS Take by mouth      aspirin 81 MG tablet Take 81 mg by mouth daily.  vitamin D (CHOLECALCIFEROL) 1000 UNIT TABS tablet Take 1,000 Units by mouth daily. No facility-administered encounter medications on file as of 2/3/2021. Lab Data:      Lab Results   Component Value Date    CHOL 132 09/11/2020    CHOL 120 03/11/2019    CHOL 120 10/01/2018     Lab Results   Component Value Date    TRIG 58 09/11/2020    TRIG 57 03/11/2019    TRIG 71 10/01/2018     Lab Results   Component Value Date    HDL 46 09/11/2020    HDL 52 03/11/2019    HDL 45 10/01/2018     Lab Results   Component Value Date    LDLCALC 74 09/11/2020    LDLCALC 57 03/11/2019    LDLCALC 61 10/01/2018     Lab Results   Component Value Date    LABVLDL 12 09/11/2020    LABVLDL 11 03/11/2019    LABVLDL 14 10/01/2018     No results found for: CHOLHDLRATIO  PT/INR: No results for input(s): PROTIME, INR in the last 72 hours.   A1C:   Lab Results   Component Value Date    LABA1C 5.8 10/01/2020       IMAGING:   EKG 3/11/19  Sinus  Bradycardia   Low voltage with rightward P-axis and rotation     CATH 7/6/16  LM <20%  LAD 20% prox, mid stent patent  D1 50% Cx 20-30%  RCA 50%  LVEF 55%    ECHO 7/6/16  Summary   Normal left ventricle size, wall thickness and systolic function with an   estimated ejection fraction of 55%. No regional wall motion abnormalities   are seen.   Diastolic filling parameters suggest grade I diastolic dysfunction.   The mitral valve is normal in structure and function. There is no   significant mitral regurgitation.   The aortic valve is normal in structure and function. There is trivial   aortic regurgitation.   The right ventricle is normal in size and function.   Tricuspid valve is structurally normal.   The tricuspid valve leaflets appear to open adequately.   There is trivial tricuspid regurgitation.   Systolic pulmonary artery pressure (SPAP) is normal and estimated at 32 mmHg   (RA pressure 3 mmHg).    CXR 7/5/16  FINDINGS: The lungs are without acute focal process. There is no effusion or pneumothorax. The cardiomediastinal silhouette is without acute process. The osseous structures are without acute process    CATH 3/25/15  LM: luminals  LAD: large LAD, wraps apex. long complex shelflike lesion in mid LAD of 80% associated with Ca+,   LCX: luminals. Small OM1, large OM2. RCA: dominant, difficult to engage, luminals    LVEDP 4  LVEF 65%  PCI of mid LAD  predialted with 2.5 x 20mm NC trek  Xience alpine 3 x 33mm   Xience 3 x 8mm  Then post with 3 x 20mm NC euphoria  ECHO 3/24/15  Summary  Normal left ventricle size, wall thickness and systolic function with an  estimated ejection fraction of 55%. No regional wall motion abnormalities  are seen. Diastolic filling parameters suggest grade I diastolic   dysfunction. Impaired  LV relaxation. The mitral valve is normal in structure and function. There is trivial  mitral regurgitation. The aortic valve is normal in structure and function. There is no  significant aortic regurgitation. The aortic root is normal in size. Ascending aorta is slight enlarged at  3.7cm. Right ventricular systolic function is normal .  The right ventricle is enlarged. The right atrium is dilated. Assessment:  Diagnoses and all orders for this visit:    Coronary artery disease involving native coronary artery of native heart with unstable angina pectoris ([address]    Essential hypertension    Mixed hyperlipidemia    Last lipids9/11/20  Labs reviewed in epic         Plan:  1.    2.   3.    4. Follow up in 1 year            QUALITY MEASURES  1. Tobacco Cessation Counseling: NA  2. Retake of BP if >140/90:   NA  3. Documentation to PCP/referring for new patient:  Sent to PCP at close of office visit  4. CAD patient on anti-platelet: DAPT  5. CAD patient on STATIN therapy:  On  zocor  6.  Patient with CHF and aFib on anticoagulation:  NA     This note was scribed in the presence of  Kizzy Girard MD by ELIZABETH Sevilla MD 2/1/2021 3:40 PM

## 2021-02-02 PROBLEM — R06.02 SOB (SHORTNESS OF BREATH): Status: RESOLVED | Noted: 2019-09-12 | Resolved: 2021-02-02

## 2021-02-02 PROBLEM — Z78.9 STATIN INTOLERANCE: Status: RESOLVED | Noted: 2020-10-01 | Resolved: 2021-02-02

## 2021-02-03 ENCOUNTER — OFFICE VISIT (OUTPATIENT)
Dept: CARDIOLOGY CLINIC | Age: 71
End: 2021-02-03
Payer: MEDICARE

## 2021-02-03 VITALS
DIASTOLIC BLOOD PRESSURE: 74 MMHG | HEART RATE: 61 BPM | OXYGEN SATURATION: 95 % | HEIGHT: 67 IN | TEMPERATURE: 96.9 F | BODY MASS INDEX: 27.94 KG/M2 | WEIGHT: 178 LBS | SYSTOLIC BLOOD PRESSURE: 132 MMHG

## 2021-02-03 DIAGNOSIS — E78.2 MIXED HYPERLIPIDEMIA: ICD-10-CM

## 2021-02-03 DIAGNOSIS — R07.2 PRECORDIAL PAIN: ICD-10-CM

## 2021-02-03 DIAGNOSIS — I25.110 CORONARY ARTERY DISEASE INVOLVING NATIVE CORONARY ARTERY OF NATIVE HEART WITH UNSTABLE ANGINA PECTORIS (HCC): Primary | ICD-10-CM

## 2021-02-03 DIAGNOSIS — I10 ESSENTIAL HYPERTENSION: ICD-10-CM

## 2021-02-03 PROCEDURE — 99214 OFFICE O/P EST MOD 30 MIN: CPT | Performed by: INTERNAL MEDICINE

## 2021-02-03 RX ORDER — CLOPIDOGREL BISULFATE 75 MG/1
75 TABLET ORAL DAILY
Qty: 90 TABLET | Refills: 3 | Status: SHIPPED | OUTPATIENT
Start: 2021-02-03 | End: 2022-02-09 | Stop reason: SDUPTHER

## 2021-02-03 RX ORDER — NITROGLYCERIN 0.4 MG/1
0.4 TABLET SUBLINGUAL EVERY 5 MIN PRN
Qty: 25 TABLET | Refills: 3 | Status: SHIPPED | OUTPATIENT
Start: 2021-02-03 | End: 2022-02-09 | Stop reason: SDUPTHER

## 2021-02-03 RX ORDER — SIMVASTATIN 20 MG
20 TABLET ORAL NIGHTLY
Qty: 90 TABLET | Refills: 3 | Status: SHIPPED | OUTPATIENT
Start: 2021-02-03 | End: 2022-02-09 | Stop reason: SDUPTHER

## 2021-02-03 NOTE — PROGRESS NOTES
for MI and was transferred to [facility] for C. Cardiac cath as reported below did not show any significant stenosis. Since then he has seen Dr Rubens Jiang and got treated with prednisone and significant improvement in SOB. He then reported muscle aches. Review of Systems:  12 point ROS negative in all areas as listed below except as in [place]  Constitutional, EENT,pulmonary, GI, , Musculoskeletal, skin, neurological, hematological, endocrine, Psychiatric    Reviewed past medical history, social, and family history. Non smoker, no alocohol  Family history- father had CABG surgery with a pacemaker. Started around 80.  at 80. Mother had arthritis. Brother had CABG 2 years ago. Father had HLD. Past Medical History:   Diagnosis Date    Amnesia 2012    Arthritis 3/31/2011    Benign positional vertigo 3/31/2011    CAD (coronary artery disease)     Carotid artery stenosis 2014    Heart murmur 1970    HTN (hypertension) 3/31/2011    Hyperlipidemia     Hypothyroid 3/2011    Neck pain 3/31/2011    S/P cardiac cath     Tubular adenoma of colon      Past Surgical History:   Procedure Laterality Date    COLONOSCOPY      COLONOSCOPY  2020    COLONOSCOPY SUBMUCOSAL/BOTOX INJECTION to control hemorrhage Dr Megan Mcrae 20    COLONOSCOPY N/A 2020    COLON W/ANES.  (13:00) performed by Clinton Tracey MD at [facility] COLONOSCOPY N/A 2020    COLONOSCOPY CONTROL HEMORRHAGE performed by Clinton Tracey MD at [facility]  2020    COLONOSCOPY SUBMUCOSAL/BOTOX INJECTION performed by Clinton Tracey MD at [address] [place] [address]  2015    DESx1 mLAD xience alpine 3x8    DIAGNOSTIC CARDIAC CATH LAB PROCEDURE      ENDOSCOPY, COLON, DIAGNOSTIC      EYE SURGERY      calcium deposits removed    HEMORRHOID SURGERY      HERNIA REPAIR      TONSILLECTOMY      TUMOR REMOVAL      left side       Objective:   BP 132/74   Pulse 61   Temp 96.9 °F (36.1 °C)   Ht 5' 7\" (1.702 m)   Wt 178 lb (80.7 kg)   SpO2 95%   BMI 27.88 kg/m²     Wt Readings from Last 3 Encounters:   02/03/21 178 lb (80.7 kg)   11/30/20 179 lb (81.2 kg)   11/17/20 171 lb (77.6 kg)       Physical Exam:  General: No Respiratory distress, appears well developed and well nourished. Eyes:  Sclera nonicteric  Nose/Sinuses:  negative findings: nose shows no deformity, asymmetry, or inflammation, nasal mucosa normal, septum midline with no perforation or bleeding  Back:  no pain to palpation  Joint:  no active joint inflammation  Musculoskeletal:  negative  Skin:  Warm and dry  Neck:  Negative for JVD and Carotid Bruits. Movements not painful. Chest:  Clear to auscultation, respiration easy  Cardiovascular:  RRR,  S1S2 normal, 2/6 BRETT all over precordium, no rub or thrill. Extremities:   No edema, clubbing, cyanosis,  Pulses:  pedal pulses are normal.Neuro: intact    Medications:   Outpatient Encounter Medications as of 2/3/2021   Medication Sig Dispense Refill    simvastatin (ZOCOR) 20 MG tablet Take 1 tablet by mouth nightly 90 tablet 3    clopidogrel (PLAVIX) 75 MG tablet Take 1 tablet by mouth daily 90 tablet 3    nitroGLYCERIN (NITROSTAT) 0.4 MG SL tablet Place 1 tablet under the tongue every 5 minutes as needed for Chest pain up to max of 3 total doses. If no relief after 1 dose, call 911. 25 tablet 3    levothyroxine (SYNTHROID) 50 MCG tablet TAKE 1 TABLET BY MOUTH EVERY DAY 90 tablet 3    Omega-3 Fatty Acids (OMEGA-3 FISH OIL) 1000 MG CAPS Take by mouth      aspirin 81 MG tablet Take 81 mg by mouth daily.  vitamin D (CHOLECALCIFEROL) 1000 UNIT TABS tablet Take 1,000 Units by mouth daily.  [DISCONTINUED] nitroGLYCERIN (NITROSTAT) 0.4 MG SL tablet Place 1 tablet under the tongue every 5 minutes as needed for Chest pain up to max of 3 total doses.  If no relief after 1 dose, call 911. 25 tablet 3    [DISCONTINUED] simvastatin (ZOCOR) 20 MG tablet Take 1 tablet by mouth nightly 90 tablet 1    [DISCONTINUED] clopidogrel (PLAVIX) 75 MG tablet Take 1 tablet by mouth daily 90 tablet 1     No facility-administered encounter medications on file as of 2/3/2021. Lab Data:      Lab Results   Component Value Date    CHOL 132 09/11/2020    CHOL 120 03/11/2019    CHOL 120 10/01/2018     Lab Results   Component Value Date    TRIG 58 09/11/2020    TRIG 57 03/11/2019    TRIG 71 10/01/2018     Lab Results   Component Value Date    HDL 46 09/11/2020    HDL 52 03/11/2019    HDL 45 10/01/2018     Lab Results   Component Value Date    LDLCALC 74 09/11/2020    LDLCALC 57 03/11/2019    LDLCALC 61 10/01/2018     Lab Results   Component Value Date    LABVLDL 12 09/11/2020    LABVLDL 11 03/11/2019    LABVLDL 14 10/01/2018     No results found for: CHOLHDLRATIO  PT/INR: No results for input(s): PROTIME, INR in the last 72 hours. A1C:   Lab Results   Component Value Date    LABA1C 5.8 10/01/2020       IMAGING:   I have reviewed the following tests and documented in this encounter as follows:   Discussed with patient. EKG 3/11/19  Sinus  Bradycardia   Low voltage with rightward P-axis and rotation     CATH 7/6/16  LM <20%  LAD 20% prox, mid stent patent  D1 50%  Cx 20-30%  RCA 50%  LVEF 55%    ECHO 7/6/16  Summary   Normal left ventricle size, wall thickness and systolic function with an   estimated ejection fraction of 55%. No regional wall motion abnormalities   are seen.   Diastolic filling parameters suggest grade I diastolic dysfunction.   The mitral valve is normal in structure and function. There is no   significant mitral regurgitation.   The aortic valve is normal in structure and function.  There is trivial   aortic regurgitation.   The right ventricle is normal in size and function.   Tricuspid valve is structurally normal.   The tricuspid valve leaflets appear to open adequately.   There is trivial tricuspid regurgitation.   Systolic pulmonary artery pressure (SPAP) is normal and estimated at 32 mmHg   (RA pressure 3 mmHg).    CXR 7/5/16  FINDINGS: The lungs are without acute focal process. There is no effusion or pneumothorax. The cardiomediastinal silhouette is without acute process. The osseous structures are without acute process    CATH 3/25/15  LM: luminals  LAD: large LAD, wraps apex. long complex shelflike lesion in mid LAD of 80% associated with Ca+,   LCX: luminals. Small OM1, large OM2. RCA: dominant, difficult to engage, luminals    LVEDP 4  LVEF 65%  PCI of mid LAD  predialted with 2.5 x 20mm NC trek  Xience alpine 3 x 33mm   Xience 3 x 8mm  Then post with 3 x 20mm NC euphoria    ECHO 3/24/15  Summary  Normal left ventricle size, wall thickness and systolic function with an  estimated ejection fraction of 55%. No regional wall motion abnormalities  are seen. Diastolic filling parameters suggest grade I diastolic   dysfunction. Impaired  LV relaxation. The mitral valve is normal in structure and function. There is trivial  mitral regurgitation. The aortic valve is normal in structure and function. There is no  significant aortic regurgitation. The aortic root is normal in size. Ascending aorta is slight enlarged at  3.7cm. Right ventricular systolic function is normal .  The right ventricle is enlarged. The right atrium is dilated. Assessment:  Encounter Diagnoses   Name Primary?  Coronary artery disease involving native coronary artery of native heart with unstable angina pectoris (HCC) Yes    Essential hypertension     Mixed hyperlipidemia     Precordial pain        Diagnoses and all orders for this visit:    1. Coronary artery disease   -non obstructive by cath 2016    2. Essential hypertension    3. Mixed hyperlipidemia   -. HDL 46, LDL 74, TG 58  4. Chest pain   5. Neck pain       Last lipids9/11/20  Labs reviewed in epic         Plan:  1. Stress test due to chest pain- GXT Myoview   2. Continue current medications   3. Healthy lifestyle education reviewed including nutrition, exercise and activity  4. Follow up in 1 year   5. Recommend COVID vaccine        This note was scribed in the presence of Yomi Smart MD by Kenneth Gomez, RN    QUALITY MEASURES  1. Tobacco Cessation Counseling: NA  2. Retake of BP if >140/90:   NA  3. Documentation to PCP/referring for new patient:  Sent to PCP at close of office visit  4. CAD patient on anti-platelet: DAPT  5. CAD patient on STATIN therapy:  On  zocor  6. Patient with CHF and aFib on anticoagulation:  NA     This note was scribed in the presence of  Rafael Mar MD by Shakir Johnson, RN     I, Dr. Rafael Mar, personally performed the services described in this documentation, as scribed by the above signed scribe in my presence. It is both accurate and complete to my knowledge. I agree with the details independently gathered by the clinical support staff, while the remaining scribed note accurately describes my personal service to the patient.       [address] [place], MD 2/3/2021 2:27 PM

## 2021-02-03 NOTE — PATIENT INSTRUCTIONS
Plan:  1. Stress test due to chest pain- GXT Myoview   2. Continue current medications   3. Healthy lifestyle education reviewed including nutrition, exercise and activity  4. Follow up in 1 year   5. Recommend COVID when possible   Your provider has ordered testing for further evaluation. An order/prescription has been included in your paper work. ? To schedule outpatient testing, contact Central Scheduling by calling [email or web address] ([phone]).

## 2021-03-02 ENCOUNTER — HOSPITAL ENCOUNTER (OUTPATIENT)
Dept: NUCLEAR MEDICINE | Age: 71
Discharge: HOME | End: 2021-03-02
Payer: MEDICARE

## 2021-03-02 ENCOUNTER — HOSPITAL ENCOUNTER (OUTPATIENT)
Dept: NON INVASIVE DIAGNOSTICS | Age: 71
Discharge: HOME | End: 2021-03-02
Payer: MEDICARE

## 2021-03-02 DIAGNOSIS — I25.110 CORONARY ARTERY DISEASE INVOLVING NATIVE CORONARY ARTERY OF NATIVE HEART WITH UNSTABLE ANGINA PECTORIS (HCC): ICD-10-CM

## 2021-03-02 DIAGNOSIS — R07.2 PRECORDIAL PAIN: ICD-10-CM

## 2021-03-02 DIAGNOSIS — I10 ESSENTIAL HYPERTENSION: ICD-10-CM

## 2021-03-02 LAB
LV EF: 67 %
LVEF MODALITY: NORMAL

## 2021-03-02 PROCEDURE — 3430000000 HC RX DIAGNOSTIC RADIOPHARMACEUTICAL: Performed by: INTERNAL MEDICINE

## 2021-03-02 PROCEDURE — 78452 HT MUSCLE IMAGE SPECT MULT: CPT

## 2021-03-02 PROCEDURE — 93017 CV STRESS TEST TRACING ONLY: CPT

## 2021-03-02 PROCEDURE — A9502 TC99M TETROFOSMIN: HCPCS | Performed by: INTERNAL MEDICINE

## 2021-03-02 RX ADMIN — TETROFOSMIN 11.4 MILLICURIE: 1.38 INJECTION, POWDER, LYOPHILIZED, FOR SOLUTION INTRAVENOUS at 09:39

## 2021-03-02 RX ADMIN — TETROFOSMIN 32.7 MILLICURIE: 1.38 INJECTION, POWDER, LYOPHILIZED, FOR SOLUTION INTRAVENOUS at 11:11

## 2021-03-03 ENCOUNTER — TELEPHONE (OUTPATIENT)
Dept: CARDIOLOGY CLINIC | Age: 71
End: 2021-03-03

## 2021-03-03 DIAGNOSIS — I10 ESSENTIAL HYPERTENSION: Primary | ICD-10-CM

## 2021-03-03 RX ORDER — LISINOPRIL 10 MG/1
10 TABLET ORAL DAILY
Qty: 30 TABLET | Refills: 5 | Status: SHIPPED | OUTPATIENT
Start: 2021-03-03 | End: 2021-06-25

## 2021-03-03 NOTE — TELEPHONE ENCOUNTER
Per Dr Kristie Rothman he reviewed his stress test further and BP readings   were high before and during test he would like to   start Lisinopril 10 mg 1 daily   keep record of home BP readings goal is 130/80 or less.     Check BMP in 4 weeks after starting Lisinopril  Orders placed in epic RX sent to pharmacy I left message on VM with instructions but asked Marija Sánchez to call me back to ensure he received message and understands

## 2021-04-09 ENCOUNTER — HOSPITAL ENCOUNTER (OUTPATIENT)
Age: 71
Discharge: HOME | End: 2021-04-09
Payer: MEDICARE

## 2021-04-09 ENCOUNTER — TELEPHONE (OUTPATIENT)
Dept: CARDIOLOGY CLINIC | Age: 71
End: 2021-04-09

## 2021-04-09 PROCEDURE — U0003 INFECTIOUS AGENT DETECTION BY NUCLEIC ACID (DNA OR RNA); SEVERE ACUTE RESPIRATORY SYNDROME CORONAVIRUS 2 (SARS-COV-2) (CORONAVIRUS DISEASE [COVID-19]), AMPLIFIED PROBE TECHNIQUE, MAKING USE OF HIGH THROUGHPUT TECHNOLOGIES AS DESCRIBED BY CMS-2020-01-R: HCPCS

## 2021-04-09 PROCEDURE — U0005 INFEC AGEN DETEC AMPLI PROBE: HCPCS

## 2021-04-09 NOTE — PROGRESS NOTES
Obstructive Sleep Apnea (EMELY) Screening     Patient:  Sheba Mortensen    YOB: 1950      Medical Record #:  [id number]                     Date:  4/9/2021     1. Are you a loud and/or regular snorer? []  Yes       [x] No    2. Have you been observed to gasp or stop breathing during sleep? []  Yes       [x] No    3. Do you feel tired or groggy upon awakening or do you awaken with a headache?           []  Yes       [] No    4. Are you often tired or fatigued during the wake time hours? []  Yes       [] No    5. Do you fall asleep sitting, reading, watching TV or driving? []  Yes       [] No    6. Do you often have problems with memory or concentration? []  Yes       [] No    **If patient's score is ? 3 they are considered high risk for EMELY. An Anesthesia provider will evaluate the patient and develop a plan of care the day of surgery. Note:  If the patient's BMI is more than 35 kg m¯² , has neck circumference > 40 cm, and/or high blood pressure the risk is greater (© American Sleep Apnea Association, 2006).

## 2021-04-09 NOTE — LETTER
[address] [address]  Phone: [phone]  Fax: [phone]    Ana Hunt MD        April 9, 2021    Carlota Ray 1950  [address] [address]      To whom it may concern: It is my medical opinion that Carlotaisis Ray is considered a low to intermediate risk clinically. Proceed as planned with knee arthroscopy. Ok to hold all blood thinners one week before surgery. Resume when safe after surgery. If you have any questions or concerns, please don't hesitate to call.     Sincerely,        Ana Hunt MD

## 2021-04-09 NOTE — PROGRESS NOTES
Nonnie Leonel    Age 80 y.o.    male    1950    N [id number]    4/13/2021  Arrival Time_____________  OR Time____________[address]     Procedure(s):  VIDEO ARTHROSCOPY LEFT KNEE, PARTIAL MEDIAL MENISCECTOMY WITH MENISCAL REPAIR                      General    Surgeon(s):  Pili Montes, MD       Phone [phone] ([place])     [address] [place] [address] [facility]  Cell         Work  _____________________________________________________________________  _____________________________________________________________________  _____________________________________________________________________  _____________________________________________________________________  _____________________________________________________________________      PCP _____________________________ Phone_________________       H&P__________________Bringing      Chart            Epic   DOS      Called________  EKG__________________Bringing      Chart            Epic   DOS      Called________  LAB__________________ Bringing      Chart            Epic   DOS      Called________  Cardiac Clearance_______Bringing      Chart            Epic      DOS      Called________    Cardiologist________________________ Phone___________________________    ? Yarsani concerns / Waiver on Chart            [employer]________________  ? Pre-op Instructions Given [place]          _________________________________  ? Directions to Surgery Center                          _________________________________  ? Transportation Home_______________      __________________________________  ?  Crutches/Walker__________________        __________________________________      ________Pre-op Orders   _______Transcribed    _______Wt.  ________Pharmacy          _______SCD  ______VTE     ______Beta Blocker  ________Consent             ________TED HOSE    COVID DATE_________   LOCATION___________________  RESULT____________

## 2021-04-09 NOTE — TELEPHONE ENCOUNTER
Letter faxed. Called pt to notify. He did not answer. Phone I left  informing cardiac clearance was faxed.

## 2021-04-09 NOTE — PROGRESS NOTES
Preoperative Screening for Elective Surgery/Invasive Procedures While COVID-19 present in the community     Have you had any of the following symptoms? No  o Fever, chills  o Cough  o Shortness of breath  o Muscle aches/pain  o Diarrhea  o Abdominal pain, nausea, vomiting  o Loss or decrease in taste and / or smell   Risk of Exposure  o Have you recently been hospitalized for COVID-19 or flu-like illness, if so when? No  o Recently diagnosed with COVID-19, if so when? No  o Recently tested for COVID-19, if so when? 4/9/2021  o Have you been in close contact with a person or family member who currently has or recently had COVID-23? If yes, when and in what context? No  o Do you live with anybody who in the last 14 days has had fever, chills, shortness of breath, muscle aches, flu-like illness? No  o Do you have any close contacts or family members who are currently in the hospital for COVID-19 or flu-like illness? No  If yes, assess recent close contact with this person. Indicate if the patient has a positive screen by answering yes to one or more of the above questions. Patients who test positive or screen positive prior to surgery or on the day of surgery should be evaluated in conjunction with the surgeon/proceduralist/anesthesiologist to determine the urgency of the procedure.

## 2021-04-09 NOTE — TELEPHONE ENCOUNTER
Pt called he needs cardiac clearance. He will be having left knee arthroplasty 4/13/21 with Dr. Trino Washington. He stopped ASA and plavix yesterday. Please fax clearance to Surgery center [phone].  Attention pre-op

## 2021-04-09 NOTE — TELEPHONE ENCOUNTER
Please send a  Letter of cardiac clearance to his orthopedist   Low to intermediate risk for surgery.   May stop all blood thinners one week before surgery resume when safe after surgery

## 2021-04-10 LAB — SARS-COV-2: NOT DETECTED

## 2021-04-12 ENCOUNTER — ANESTHESIA EVENT (OUTPATIENT)
Dept: OPERATING ROOM | Age: 71
End: 2021-04-12
Payer: MEDICARE

## 2021-04-12 ENCOUNTER — OFFICE VISIT (OUTPATIENT)
Dept: FAMILY MEDICINE CLINIC | Age: 71
End: 2021-04-12
Payer: MEDICARE

## 2021-04-12 VITALS
DIASTOLIC BLOOD PRESSURE: 86 MMHG | WEIGHT: 170.4 LBS | TEMPERATURE: 97.4 F | SYSTOLIC BLOOD PRESSURE: 134 MMHG | HEART RATE: 53 BPM | HEIGHT: 67 IN | OXYGEN SATURATION: 99 % | BODY MASS INDEX: 26.74 KG/M2

## 2021-04-12 DIAGNOSIS — I10 ESSENTIAL HYPERTENSION: ICD-10-CM

## 2021-04-12 DIAGNOSIS — I25.110 CORONARY ARTERY DISEASE INVOLVING NATIVE CORONARY ARTERY OF NATIVE HEART WITH UNSTABLE ANGINA PECTORIS (HCC): ICD-10-CM

## 2021-04-12 DIAGNOSIS — Z01.818 PRE-OP EXAMINATION: Primary | ICD-10-CM

## 2021-04-12 DIAGNOSIS — R73.03 PREDIABETES: ICD-10-CM

## 2021-04-12 LAB
ANION GAP SERPL CALCULATED.3IONS-SCNC: 10 MMOL/L (ref 3–16)
BUN BLDV-MCNC: 12 MG/DL (ref 7–20)
CALCIUM SERPL-MCNC: 8.9 MG/DL (ref 8.3–10.6)
CHLORIDE BLD-SCNC: 103 MMOL/L (ref 99–110)
CO2: 26 MMOL/L (ref 21–32)
CREAT SERPL-MCNC: 0.7 MG/DL (ref 0.8–1.3)
GFR AFRICAN AMERICAN: >60
GFR NON-AFRICAN AMERICAN: >60
GLUCOSE BLD-MCNC: 103 MG/DL (ref 70–99)
POTASSIUM SERPL-SCNC: 5.1 MMOL/L (ref 3.5–5.1)
SODIUM BLD-SCNC: 139 MMOL/L (ref 136–145)

## 2021-04-12 PROCEDURE — 99214 OFFICE O/P EST MOD 30 MIN: CPT | Performed by: STUDENT IN AN ORGANIZED HEALTH CARE EDUCATION/TRAINING PROGRAM

## 2021-04-12 PROCEDURE — 93000 ELECTROCARDIOGRAM COMPLETE: CPT | Performed by: STUDENT IN AN ORGANIZED HEALTH CARE EDUCATION/TRAINING PROGRAM

## 2021-04-12 SDOH — ECONOMIC STABILITY: TRANSPORTATION INSECURITY
IN THE PAST 12 MONTHS, HAS LACK OF TRANSPORTATION KEPT YOU FROM MEETINGS, WORK, OR FROM GETTING THINGS NEEDED FOR DAILY LIVING?: NO

## 2021-04-12 SDOH — ECONOMIC STABILITY: INCOME INSECURITY: HOW HARD IS IT FOR YOU TO PAY FOR THE VERY BASICS LIKE FOOD, HOUSING, MEDICAL CARE, AND HEATING?: NOT HARD AT ALL

## 2021-04-12 SDOH — ECONOMIC STABILITY: TRANSPORTATION INSECURITY
IN THE PAST 12 MONTHS, HAS THE LACK OF TRANSPORTATION KEPT YOU FROM MEDICAL APPOINTMENTS OR FROM GETTING MEDICATIONS?: NO

## 2021-04-12 ASSESSMENT — PATIENT HEALTH QUESTIONNAIRE - PHQ9
SUM OF ALL RESPONSES TO PHQ QUESTIONS 1-9: 0
SUM OF ALL RESPONSES TO PHQ9 QUESTIONS 1 & 2: 0
1. LITTLE INTEREST OR PLEASURE IN DOING THINGS: 0

## 2021-04-12 NOTE — PROGRESS NOTES
calcium deposits removed    HEMORRHOID SURGERY  1988    HERNIA REPAIR      TONSILLECTOMY      TUMOR REMOVAL Left     above hip-benign       Social History     Tobacco History     Smoking Status  Never Smoker    Smokeless Tobacco Use  Never Used          Alcohol History     Alcohol Use Status  Yes Drinks/Week  10-14 Cans of beer per week Amount  10.0 - 14.0 standard drinks of alcohol/wk          Drug Use     Drug Use Status  No          Sexual Activity     Sexually Active  Yes Partners  Female                Family History   Problem Relation Age of Onset    Arthritis Mother     Heart Disease Father     Cancer Sister        Current Outpatient Medications   Medication Sig Dispense Refill    lisinopril (PRINIVIL;ZESTRIL) 10 MG tablet Take 1 tablet by mouth daily 30 tablet 5    simvastatin (ZOCOR) 20 MG tablet Take 1 tablet by mouth nightly 90 tablet 3    clopidogrel (PLAVIX) 75 MG tablet Take 1 tablet by mouth daily 90 tablet 3    nitroGLYCERIN (NITROSTAT) 0.4 MG SL tablet Place 1 tablet under the tongue every 5 minutes as needed for Chest pain up to max of 3 total doses. If no relief after 1 dose, call 911. 25 tablet 3    levothyroxine (SYNTHROID) 50 MCG tablet TAKE 1 TABLET BY MOUTH EVERY DAY 90 tablet 3    Omega-3 Fatty Acids (OMEGA-3 FISH OIL) 1000 MG CAPS Take by mouth daily       aspirin 81 MG tablet Take 81 mg by mouth daily.  vitamin D (CHOLECALCIFEROL) 1000 UNIT TABS tablet Take 1,000 Units by mouth daily. No current facility-administered medications for this visit.         Objective:   Vitals:    04/12/21 1030   BP: 134/86   Pulse:    Temp:    SpO2:        Physical Exam   /86 (Site: Left Upper Arm)   Pulse 53   Temp 97.4 °F (36.3 °C)   Ht 5' 7\" (1.702 m)   Wt 170 lb 6.4 oz (77.3 kg)   SpO2 99%   BMI 26.69 kg/m²   General appearance: alert, appears stated age, and cooperative  Lungs: clear to auscultation bilaterally  Heart: regular rate and rhythm, S1, S2 normal, no murmur, click, rub or gallop  Abdomen: soft, non-tender; bowel sounds normal; no masses,  no organomegaly  Extremities: extremities normal, atraumatic, no cyanosis or edema  Pulses: 2+ and symmetric  Skin: Skin color, texture, turgor normal. No rashes or lesions  Neurologic: Grossly normal     Predictors of intubation difficulty:   Morbid obesity? no   Anatomically abnormal facies? no   Short, thick neck? no   Neck range of motion: normal   Dentition: No chipped, or loose teeth. A few missing teeth. Cardiographics   ECG: Sinus Bradycardia without significant change from previous       Lab Review   Hospital Outpatient Visit on 04/09/2021   Component Date Value    SARS-CoV-2 04/09/2021 Not Detected    Hospital Outpatient Visit on 03/02/2021   Component Date Value    Left Ventricular Ejectio* 03/02/2021 67     LVEF MODALITY 03/02/2021 Nuclear    Hospital Outpatient Visit on 11/11/2020   Component Date Value    SARS-CoV-2 11/11/2020 Not Detected    Abstract on 10/26/2020   Component Date Value    FECAL BLOOD IMMUNOCHEMIC* 10/14/2020 positive         Assessment:   80 y.o. male with planned surgery as above. - Known risk factors for perioperative complications: Coronary disease   - Difficulty with intubation is not anticipated. - Current medications which may produce withdrawal symptoms if withheld perioperatively:      Plan:   1. Preoperative workup as follows ECG   2. Change in medication regimen before surgery: none, continue med regimen including morning of surgery, w/sip of water   Pt instructed to avoid NSAIDs, ASA, MV, Vitamin E, Fish oil 1 week prior to surgery to decrease bleeding risk. 3. Prophylaxis for cardiac events with perioperative beta-blockers: not indicated   4. Invasive hemodynamic monitoring perioperatively: not indicated   5. Deep vein thrombosis prophylaxis postoperatively:regimen to be chosen by surgical team   6. Other measures: none      1. Pre-op examination  2.  Coronary artery disease involving native coronary artery of native heart with unstable angina pectoris ([facility] [address]  Recent stress test with clearance from Dr. Pee Clemente. Sinus bradycardia today without significant change from previous. - EKG 12 Lead    3. Prediabetes  Last A1c 5.8    4. Essential hypertension  At goal today          While assessing care for this patient, I have reviewed all pertinent lab work/imaging/ specialist notes and care in reference to those problems addressed above in detail. Appropriate medical decision making was based on this. Please note that portions of this note may have been completed with a voice recognition program. Efforts were made to edit the dictations but occasionally words are mis-transcribed.       Pt is at an acceptable risk for planned procedure    Please call with any questions  Yelena Jones, DO

## 2021-04-13 ENCOUNTER — HOSPITAL ENCOUNTER (OUTPATIENT)
Age: 71
Setting detail: OUTPATIENT SURGERY
Discharge: HOME | End: 2021-04-13
Attending: ORTHOPAEDIC SURGERY | Admitting: ORTHOPAEDIC SURGERY
Payer: MEDICARE

## 2021-04-13 ENCOUNTER — ANESTHESIA (OUTPATIENT)
Dept: OPERATING ROOM | Age: 71
End: 2021-04-13
Payer: MEDICARE

## 2021-04-13 VITALS
OXYGEN SATURATION: 97 % | RESPIRATION RATE: 1 BRPM | SYSTOLIC BLOOD PRESSURE: 124 MMHG | DIASTOLIC BLOOD PRESSURE: 77 MMHG

## 2021-04-13 VITALS
RESPIRATION RATE: 16 BRPM | HEART RATE: 65 BPM | SYSTOLIC BLOOD PRESSURE: 148 MMHG | DIASTOLIC BLOOD PRESSURE: 78 MMHG | TEMPERATURE: 97 F | OXYGEN SATURATION: 95 % | HEIGHT: 67 IN | BODY MASS INDEX: 26.74 KG/M2 | WEIGHT: 170.4 LBS

## 2021-04-13 DIAGNOSIS — M25.562 ACUTE PAIN OF LEFT KNEE: Primary | ICD-10-CM

## 2021-04-13 PROCEDURE — C1713 ANCHOR/SCREW BN/BN,TIS/BN: HCPCS | Performed by: ORTHOPAEDIC SURGERY

## 2021-04-13 PROCEDURE — 7100000001 HC PACU RECOVERY - ADDTL 15 MIN: Performed by: ORTHOPAEDIC SURGERY

## 2021-04-13 PROCEDURE — 2709999900 HC NON-CHARGEABLE SUPPLY: Performed by: ORTHOPAEDIC SURGERY

## 2021-04-13 PROCEDURE — 2500000003 HC RX 250 WO HCPCS: Performed by: NURSE ANESTHETIST, CERTIFIED REGISTERED

## 2021-04-13 PROCEDURE — 6360000002 HC RX W HCPCS: Performed by: ANESTHESIOLOGY

## 2021-04-13 PROCEDURE — 6370000000 HC RX 637 (ALT 250 FOR IP): Performed by: ANESTHESIOLOGY

## 2021-04-13 PROCEDURE — 3600000004 HC SURGERY LEVEL 4 BASE: Performed by: ORTHOPAEDIC SURGERY

## 2021-04-13 PROCEDURE — 2720000010 HC SURG SUPPLY STERILE: Performed by: ORTHOPAEDIC SURGERY

## 2021-04-13 PROCEDURE — 2500000003 HC RX 250 WO HCPCS: Performed by: ORTHOPAEDIC SURGERY

## 2021-04-13 PROCEDURE — C1769 GUIDE WIRE: HCPCS | Performed by: ORTHOPAEDIC SURGERY

## 2021-04-13 PROCEDURE — 7100000010 HC PHASE II RECOVERY - FIRST 15 MIN: Performed by: ORTHOPAEDIC SURGERY

## 2021-04-13 PROCEDURE — 7100000000 HC PACU RECOVERY - FIRST 15 MIN: Performed by: ORTHOPAEDIC SURGERY

## 2021-04-13 PROCEDURE — 2580000003 HC RX 258: Performed by: ANESTHESIOLOGY

## 2021-04-13 PROCEDURE — 6360000002 HC RX W HCPCS: Performed by: ORTHOPAEDIC SURGERY

## 2021-04-13 PROCEDURE — 3600000014 HC SURGERY LEVEL 4 ADDTL 15MIN: Performed by: ORTHOPAEDIC SURGERY

## 2021-04-13 PROCEDURE — 3700000000 HC ANESTHESIA ATTENDED CARE: Performed by: ORTHOPAEDIC SURGERY

## 2021-04-13 PROCEDURE — 2580000003 HC RX 258: Performed by: ORTHOPAEDIC SURGERY

## 2021-04-13 PROCEDURE — 6360000002 HC RX W HCPCS: Performed by: NURSE ANESTHETIST, CERTIFIED REGISTERED

## 2021-04-13 PROCEDURE — 3700000001 HC ADD 15 MINUTES (ANESTHESIA): Performed by: ORTHOPAEDIC SURGERY

## 2021-04-13 DEVICE — FAST-FIX 360 CURVED MENISCAL                                    REPAIR SYSTEM
Type: IMPLANTABLE DEVICE | Site: KNEE | Status: FUNCTIONAL
Brand: FAST-FIX

## 2021-04-13 DEVICE — ENDOBUTTON CL ULTRA 20MM
Type: IMPLANTABLE DEVICE | Site: KNEE | Status: FUNCTIONAL
Brand: ENDOBUTTON

## 2021-04-13 RX ORDER — PROMETHAZINE HYDROCHLORIDE 25 MG/ML
6.25 INJECTION, SOLUTION INTRAMUSCULAR; INTRAVENOUS
Status: DISCONTINUED | OUTPATIENT
Start: 2021-04-13 | End: 2021-04-13 | Stop reason: HOSPADM

## 2021-04-13 RX ORDER — DIPHENHYDRAMINE HYDROCHLORIDE 50 MG/ML
12.5 INJECTION INTRAMUSCULAR; INTRAVENOUS
Status: DISCONTINUED | OUTPATIENT
Start: 2021-04-13 | End: 2021-04-13 | Stop reason: HOSPADM

## 2021-04-13 RX ORDER — SODIUM CHLORIDE, SODIUM LACTATE, POTASSIUM CHLORIDE, AND CALCIUM CHLORIDE .6; .31; .03; .02 G/100ML; G/100ML; G/100ML; G/100ML
IRRIGANT IRRIGATION PRN
Status: DISCONTINUED | OUTPATIENT
Start: 2021-04-13 | End: 2021-04-13 | Stop reason: ALTCHOICE

## 2021-04-13 RX ORDER — LIDOCAINE HYDROCHLORIDE 10 MG/ML
0.3 INJECTION, SOLUTION EPIDURAL; INFILTRATION; INTRACAUDAL; PERINEURAL
Status: DISCONTINUED | OUTPATIENT
Start: 2021-04-13 | End: 2021-04-13 | Stop reason: HOSPADM

## 2021-04-13 RX ORDER — LIDOCAINE HYDROCHLORIDE 20 MG/ML
INJECTION, SOLUTION INFILTRATION; PERINEURAL PRN
Status: DISCONTINUED | OUTPATIENT
Start: 2021-04-13 | End: 2021-04-13 | Stop reason: SDUPTHER

## 2021-04-13 RX ORDER — DEXAMETHASONE SODIUM PHOSPHATE 10 MG/ML
INJECTION INTRAMUSCULAR; INTRAVENOUS PRN
Status: DISCONTINUED | OUTPATIENT
Start: 2021-04-13 | End: 2021-04-13 | Stop reason: SDUPTHER

## 2021-04-13 RX ORDER — PROPOFOL 10 MG/ML
INJECTION, EMULSION INTRAVENOUS PRN
Status: DISCONTINUED | OUTPATIENT
Start: 2021-04-13 | End: 2021-04-13 | Stop reason: SDUPTHER

## 2021-04-13 RX ORDER — ONDANSETRON 2 MG/ML
INJECTION INTRAMUSCULAR; INTRAVENOUS PRN
Status: DISCONTINUED | OUTPATIENT
Start: 2021-04-13 | End: 2021-04-13 | Stop reason: SDUPTHER

## 2021-04-13 RX ORDER — SODIUM CHLORIDE 0.9 % (FLUSH) 0.9 %
5-40 SYRINGE (ML) INJECTION EVERY 12 HOURS SCHEDULED
Status: DISCONTINUED | OUTPATIENT
Start: 2021-04-13 | End: 2021-04-13 | Stop reason: HOSPADM

## 2021-04-13 RX ORDER — MORPHINE SULFATE 2 MG/ML
1 INJECTION, SOLUTION INTRAMUSCULAR; INTRAVENOUS EVERY 5 MIN PRN
Status: DISCONTINUED | OUTPATIENT
Start: 2021-04-13 | End: 2021-04-13 | Stop reason: HOSPADM

## 2021-04-13 RX ORDER — MORPHINE SULFATE 2 MG/ML
2 INJECTION, SOLUTION INTRAMUSCULAR; INTRAVENOUS EVERY 5 MIN PRN
Status: DISCONTINUED | OUTPATIENT
Start: 2021-04-13 | End: 2021-04-13 | Stop reason: HOSPADM

## 2021-04-13 RX ORDER — ONDANSETRON 2 MG/ML
4 INJECTION INTRAMUSCULAR; INTRAVENOUS PRN
Status: DISCONTINUED | OUTPATIENT
Start: 2021-04-13 | End: 2021-04-13 | Stop reason: HOSPADM

## 2021-04-13 RX ORDER — FENTANYL CITRATE 50 UG/ML
INJECTION, SOLUTION INTRAMUSCULAR; INTRAVENOUS PRN
Status: DISCONTINUED | OUTPATIENT
Start: 2021-04-13 | End: 2021-04-13 | Stop reason: SDUPTHER

## 2021-04-13 RX ORDER — BUPIVACAINE HYDROCHLORIDE 2.5 MG/ML
INJECTION, SOLUTION INFILTRATION; PERINEURAL PRN
Status: DISCONTINUED | OUTPATIENT
Start: 2021-04-13 | End: 2021-04-13 | Stop reason: ALTCHOICE

## 2021-04-13 RX ORDER — SUCCINYLCHOLINE CHLORIDE 20 MG/ML
INJECTION INTRAMUSCULAR; INTRAVENOUS PRN
Status: DISCONTINUED | OUTPATIENT
Start: 2021-04-13 | End: 2021-04-13 | Stop reason: SDUPTHER

## 2021-04-13 RX ORDER — HYDRALAZINE HYDROCHLORIDE 20 MG/ML
5 INJECTION INTRAMUSCULAR; INTRAVENOUS EVERY 10 MIN PRN
Status: DISCONTINUED | OUTPATIENT
Start: 2021-04-13 | End: 2021-04-13 | Stop reason: HOSPADM

## 2021-04-13 RX ORDER — LABETALOL HYDROCHLORIDE 5 MG/ML
5 INJECTION, SOLUTION INTRAVENOUS EVERY 10 MIN PRN
Status: DISCONTINUED | OUTPATIENT
Start: 2021-04-13 | End: 2021-04-13 | Stop reason: HOSPADM

## 2021-04-13 RX ORDER — SODIUM CHLORIDE 9 MG/ML
25 INJECTION, SOLUTION INTRAVENOUS PRN
Status: DISCONTINUED | OUTPATIENT
Start: 2021-04-13 | End: 2021-04-13 | Stop reason: HOSPADM

## 2021-04-13 RX ORDER — SODIUM CHLORIDE, SODIUM LACTATE, POTASSIUM CHLORIDE, CALCIUM CHLORIDE 600; 310; 30; 20 MG/100ML; MG/100ML; MG/100ML; MG/100ML
INJECTION, SOLUTION INTRAVENOUS CONTINUOUS
Status: DISCONTINUED | OUTPATIENT
Start: 2021-04-13 | End: 2021-04-13 | Stop reason: HOSPADM

## 2021-04-13 RX ORDER — OXYCODONE HYDROCHLORIDE AND ACETAMINOPHEN 5; 325 MG/1; MG/1
1 TABLET ORAL PRN
Status: COMPLETED | OUTPATIENT
Start: 2021-04-13 | End: 2021-04-13

## 2021-04-13 RX ORDER — OXYCODONE HYDROCHLORIDE 5 MG/1
5 TABLET ORAL EVERY 6 HOURS PRN
Qty: 20 TABLET | Refills: 0 | Status: SHIPPED | OUTPATIENT
Start: 2021-04-13 | End: 2021-04-18

## 2021-04-13 RX ORDER — MEPERIDINE HYDROCHLORIDE 50 MG/ML
12.5 INJECTION INTRAMUSCULAR; INTRAVENOUS; SUBCUTANEOUS EVERY 5 MIN PRN
Status: DISCONTINUED | OUTPATIENT
Start: 2021-04-13 | End: 2021-04-13 | Stop reason: HOSPADM

## 2021-04-13 RX ORDER — OXYCODONE HYDROCHLORIDE AND ACETAMINOPHEN 5; 325 MG/1; MG/1
2 TABLET ORAL PRN
Status: COMPLETED | OUTPATIENT
Start: 2021-04-13 | End: 2021-04-13

## 2021-04-13 RX ORDER — SODIUM CHLORIDE 0.9 % (FLUSH) 0.9 %
5-40 SYRINGE (ML) INJECTION PRN
Status: DISCONTINUED | OUTPATIENT
Start: 2021-04-13 | End: 2021-04-13 | Stop reason: HOSPADM

## 2021-04-13 RX ADMIN — PROPOFOL 170 MG: 10 INJECTION, EMULSION INTRAVENOUS at 15:13

## 2021-04-13 RX ADMIN — CEFAZOLIN SODIUM 2 G: 10 INJECTION, POWDER, FOR SOLUTION INTRAVENOUS at 15:09

## 2021-04-13 RX ADMIN — SODIUM CHLORIDE, POTASSIUM CHLORIDE, SODIUM LACTATE AND CALCIUM CHLORIDE: 600; 310; 30; 20 INJECTION, SOLUTION INTRAVENOUS at 17:20

## 2021-04-13 RX ADMIN — FENTANYL CITRATE 50 MCG: 50 INJECTION INTRAMUSCULAR; INTRAVENOUS at 15:23

## 2021-04-13 RX ADMIN — SUCCINYLCHOLINE CHLORIDE 40 MG: 20 INJECTION, SOLUTION INTRAMUSCULAR; INTRAVENOUS at 15:15

## 2021-04-13 RX ADMIN — SODIUM CHLORIDE, POTASSIUM CHLORIDE, SODIUM LACTATE AND CALCIUM CHLORIDE: 600; 310; 30; 20 INJECTION, SOLUTION INTRAVENOUS at 13:22

## 2021-04-13 RX ADMIN — DEXAMETHASONE SODIUM PHOSPHATE 10 MG: 10 INJECTION INTRAMUSCULAR; INTRAVENOUS at 15:23

## 2021-04-13 RX ADMIN — HYDROMORPHONE HYDROCHLORIDE 0.5 MG: 1 INJECTION, SOLUTION INTRAMUSCULAR; INTRAVENOUS; SUBCUTANEOUS at 17:16

## 2021-04-13 RX ADMIN — FENTANYL CITRATE 50 MCG: 50 INJECTION INTRAMUSCULAR; INTRAVENOUS at 15:32

## 2021-04-13 RX ADMIN — ONDANSETRON 4 MG: 2 INJECTION INTRAMUSCULAR; INTRAVENOUS at 15:23

## 2021-04-13 RX ADMIN — OXYCODONE AND ACETAMINOPHEN 1 TABLET: 5; 325 TABLET ORAL at 17:07

## 2021-04-13 RX ADMIN — LIDOCAINE HYDROCHLORIDE 80 MG: 20 INJECTION, SOLUTION INFILTRATION; PERINEURAL at 15:13

## 2021-04-13 RX ADMIN — HYDROMORPHONE HYDROCHLORIDE 0.5 MG: 1 INJECTION, SOLUTION INTRAMUSCULAR; INTRAVENOUS; SUBCUTANEOUS at 17:23

## 2021-04-13 ASSESSMENT — PULMONARY FUNCTION TESTS
PIF_VALUE: 2
PIF_VALUE: 2
PIF_VALUE: 28
PIF_VALUE: 2
PIF_VALUE: 1
PIF_VALUE: 2
PIF_VALUE: 1
PIF_VALUE: 2
PIF_VALUE: 3
PIF_VALUE: 2
PIF_VALUE: 0
PIF_VALUE: 2
PIF_VALUE: 3
PIF_VALUE: 2
PIF_VALUE: 1
PIF_VALUE: 3
PIF_VALUE: 2
PIF_VALUE: 3
PIF_VALUE: 2
PIF_VALUE: 5
PIF_VALUE: 1
PIF_VALUE: 14

## 2021-04-13 ASSESSMENT — PAIN SCALES - GENERAL
PAINLEVEL_OUTOF10: 6
PAINLEVEL_OUTOF10: 4

## 2021-04-13 ASSESSMENT — PAIN DESCRIPTION - PAIN TYPE: TYPE: SURGICAL PAIN

## 2021-04-13 NOTE — OP NOTE
Joshua Payment ...            Arthroscopic Knee Meniscal Root Repair    Amy London MD      Patient Name:   Hansel Treviño    YOB: 1950       Age:  80 y.o. Sex: male    Medical Record #:  [id number]    Date of Procedure:  4/14/2021    Operating Surgeon:  Amy London MD      Pre-Operative Diagnosis:  A) left Knee medial Meniscal Root Tear    Post-Operative Diagnosis: A) left Knee medial Meniscal Root Tear                                                  Procedure:  left Knee medial Arthroscopic Meniscal Root Repair  (CPT: 12571-74)    Modifier 22 Explanation: Patient's injury and meniscal damage was unique and complex. This patient sustained a large complete tear of the posterior meniscal root attachment. This meniscal root tear disrupts the entire hoop stress system of the meniscus and must be reattached to the tibial articular surface. Because this repair is not similar to a standard soft tissue meniscus repair it is significantly more difficult and is a significantly more complex procedure. The procedure involves placing sutures in the meniscal root using very special instrumentation because of its location in the posterior aspect of the knee joint. It also requires drilling a tunnel from the anterior aspect of the proximal tibia exiting the posterior aspect of of the articular surface of the tibia at the previous meniscal root attachment site. The articular surface at this location of the tibia must be removed and the bone prepared in a fashion that would stimulate healing. The sutures previously placed in the meniscus must then be passed down through the drilled the tibial tunnel and out the anterior aspect of the lower leg bone, tibia. And this then must be secured anteriorly.   This complex procedure is not performed routinely and therefore requires specific expertise, specific training, extended operative time and also requires a specific technological equipment to perform this procedure. In addition because of the difficulty and experienced physician assistant is required due to the need for additional trained personnel as a part of the surgical team.  It is estimated that the time and difficulty of this case was increased by 20% above a normal meniscal repair procedure. DVT Prophylaxis: Intermittent Compression devices for DVT prophylaxis were placed on the non-operative lower extremity. Implants: [employer]utton    Infection Control:  Preoperative prophylactic antibiotics were given intravenously following appropriate [place] protocol. Anesthesia:  General                             Complications: None apparent. Intraoperative Blood Loss: minimal     Indications for Operation  Knee pain in conjunction with a clinical examination consistent with meniscal tear. Also,  MRI confirmed meniscal root tear. The patient chose to proceed with the aforementioned procedures. At no time were any guarantees implied or stated. Informed Consent  The risks and possible complications of this procedure have been discussed in detail. The post operative protocol has also been reviewed. This complex procedure is significantly more involved than a standard meniscal repair. This also requires significantly more compliance with restrictions postoperatively to prevent re-tear while we are awaiting healing of the meniscal root. We spent significant time specifically discussing the postoperative protocol and the restrictions associated with that protocol. The importance of this rehabilitation has been stressed to the patient. This rehab is vital to the surgical recovery. The patient understands the risks, possible complications, and their responsibilities. Site Marking and Surgical Prep  The patient was seen in the holding area and the appropriate extremity marked with a pen.   The patient was taken to the operative suite, identified, placed on the operating room table in the supine position. After induction of general anesthesia, a well padded tourniquet was placed around the upper thigh. The extremity was placed in the leg jordan, prepped with Chloraprep and draped in the normal, sterile fashion. Examination  Under Anesthesia  There was full passive range of motion of the affected knee. Diagnostic Arthroscopy  A standard inferolateral portal was established. The trocar and cannula were inserted. The trocar was removed and the arthroscope and inflow inserted. A superior medial outflow portal was also established. The inferomedial portal was established under direct vision after localizing the joint with a spinal needle. A nerve hook was inserted and all relevant structures probed. 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 This tear was complete with the remainder of the meniscus in good condition.   Intercondylar Notch-The ACL was intact. The PCL was intact    Lateral Compartment- Thel lateral meniscus and articular cartilage were intact. Mensical Root Repair Procedure,   Meniscus repair:  The meniscal root repair site was identified. Using a probe we inspected the meniscal root itself in the location of the disruption. We then used a grasper to help us identify the site of planned reattachment of the meniscal root. Once that site was identified both a 3.5 motorized shaver as well as a loop curette was used to decorticate the site in preparation of repair. Using the [facility] root repair kit and the meniscus repair first pass system we were able to pass to cinch sutures with in the torn root of the meniscal root. The integrity of the sutures was tested and felt to be adequately secure. A mini ultra tape was used for this cinch suture.   Small incision was then placed on the anterior aspect of the tibia just medial to the crest and the Celotor root repair guide was used to pass a guidepin from the anterior medial tibial metaphysis which exited at the root repair site on the posterior tibial plateau. A suture passing device was then threaded into this tunnel exiting at the posterior tibial plateau. This suture passing device was then used to pull the limbs of the 2 previously placed since sutures out the anterior aspect of the medial tibial metaphysis. I was then able to place tension on the sutures and identify the site of attachment for the meniscal root and it fit directly within the area of decorticated bone. We were then able to permanently secure the sutures on the anterior medial tibial metaphyseal cortex using a [employer] Endobutton. Once the sutures were secured we then were able to directly visualize probing of the root repair and made certain that a stable construct was obtained. I was very satisfied with the result. Final Inspection  With the arthroscope a final inspection of the joint was performed. This included the entire joint. The inspection was performed to identify any additional pathology which may not have yet been recognized. All areas of the joint were examined for a final time. The inspection also examined that  the procedure was performed in proper technique to make sure that the entire procedure had been completed and that hopefully this correction will allow the patient to proceed with an expedient recovery. Closure  The tourniquet was deflated. The tourniquet was deflated and hemostasis obtained with electrocautery. The subcutaneous tissue was closed with 2-0 Vicryl suture and the skin closed with 4-0 Monocryl. All portal sites were closed in a simple fashion with 4-0 nylon. Marcaine (0.5%) was injected into the joint as needed if a block was not previously placed. .  Sterile dressings and an elastic

## 2021-04-13 NOTE — ANESTHESIA PRE PROCEDURE
Department of Anesthesiology  Preprocedure Note       Name:  Frank Arredondo   Age:  80 y.o.  :  1950                                          MRN:  [id number]         Date:  2021      Surgeon: Pearl Hillman):  Brooklyn Vinson MD    Procedure: Procedure(s):  VIDEO ARTHROSCOPY LEFT KNEE, PARTIAL MEDIAL MENISCECTOMY WITH MENISCAL REPAIR    Medications prior to admission:   Prior to Admission medications    Medication Sig Start Date End Date Taking? Authorizing Provider   lisinopril (PRINIVIL;ZESTRIL) 10 MG tablet Take 1 tablet by mouth daily 3/3/21   Arthur Naik MD   simvastatin (ZOCOR) 20 MG tablet Take 1 tablet by mouth nightly 2/3/21   Arthur Naik, MD   clopidogrel (PLAVIX) 75 MG tablet Take 1 tablet by mouth daily 2/3/21   Arthur Naik, MD   nitroGLYCERIN (NITROSTAT) 0.4 MG SL tablet Place 1 tablet under the tongue every 5 minutes as needed for Chest pain up to max of 3 total doses. If no relief after 1 dose, call 911. 2/3/21   Arthur Naik MD   levothyroxine (SYNTHROID) 50 MCG tablet TAKE 1 TABLET BY MOUTH EVERY DAY 10/30/20   Chris George MD   Omega-3 Fatty Acids (OMEGA-3 FISH OIL) 1000 MG CAPS Take by mouth daily     Historical Provider, MD   aspirin 81 MG tablet Take 81 mg by mouth daily. Historical Provider, MD   vitamin D (CHOLECALCIFEROL) 1000 UNIT TABS tablet Take 1,000 Units by mouth daily.     Historical Provider, MD       Current medications:    Current Facility-Administered Medications   Medication Dose Route Frequency Provider Last Rate Last Admin    ceFAZolin (ANCEF) 2000 mg in dextrose 5 % 100 mL IVPB  2,000 mg Intravenous On Call to 321 Rafael Godfrey MD        lactated ringers infusion   Intravenous Continuous Marshal Rebolledo  mL/hr at 21 1322 New Bag at 21 1322    sodium chloride flush 0.9 % injection 5-40 mL  5-40 mL Intravenous 2 times per day Marshal Rebolledo MD        sodium chloride flush 0.9 % injection 5-40 mL  5-40 mL Intravenous PRN Trey Rincon MD        0.9 % sodium chloride infusion  25 mL Intravenous PRN Trey Rincon MD        lidocaine PF 1 % injection 0.3 mL  0.3 mL Intradermal Once PRN Trey Rincon MD           Allergies: Allergies   Allergen Reactions    Penicillins Shortness Of Breath    Statins Other (See Comments)     Muscle pain       Problem List:    Patient Active Problem List   Diagnosis Code    Mixed hyperlipidemia E78.2    Hypothyroid E03.9    Neck pain M54.2    Arthritis M19.90    Essential hypertension I10    Benign positional vertigo H81.10    Carotid artery stenosis I65.29    Prediabetes R73.03    Coronary artery disease involving native coronary artery of native heart with unstable angina pectoris (HCC) I25.110    Angina, class III (HCC) I20.9    Dizziness R42       Past Medical History:        Diagnosis Date    Amnesia 9/1/2012    Arthritis 3/31/2011    Benign positional vertigo 3/31/2011    CAD (coronary artery disease)     Carotid artery stenosis 8/18/2014    Heart murmur 1970    HTN (hypertension) 3/31/2011    Hyperlipidemia     Hypothyroid 3/2011    Neck pain 3/31/2011    Tubular adenoma of colon        Past Surgical History:        Procedure Laterality Date    COLONOSCOPY      COLONOSCOPY  11/17/2020    COLONOSCOPY SUBMUCOSAL/BOTOX INJECTION to control hemorrhage Dr Pierre Allen 11/17/20    COLONOSCOPY N/A 11/17/2020    COLON W/ANES.  (13:00) performed by Yaneth Llamas MD at [address] [facility] COLONOSCOPY N/A 11/17/2020    COLONOSCOPY CONTROL HEMORRHAGE performed by Yaneth Llamas MD at [place] [address]  11/17/2020    COLONOSCOPY SUBMUCOSAL/BOTOX INJECTION performed by Yaneth Llamas MD at [address] [place] [address]  03/25/2015    DESx1 mLAD xience alpine 3x8    DIAGNOSTIC CARDIAC CATH LAB PROCEDURE      ENDOSCOPY, COLON, DIAGNOSTIC      EYE SURGERY      calcium deposits removed   [facility] No results for input(s): POCGLU, POCNA, POCK, POCCL, POCBUN, POCHEMO, POCHCT in the last 72 hours. Coags:   Lab Results   Component Value Date    PROTIME 11.4 03/24/2015    INR 1.05 03/24/2015    APTT 30.7 08/31/2012       HCG (If Applicable): No results found for: PREGTESTUR, PREGSERUM, HCG, HCGQUANT     ABGs:   Lab Results   Component Value Date    PHART 7.447 09/01/2012    PO2ART 79.4 09/01/2012    KIK2GLW 39.5 09/01/2012    MJW4GBF 26.7 09/01/2012    BEART 2.6 09/01/2012    I6XFGQBN 96.1 09/01/2012        Type & Screen (If Applicable):  No results found for: LABABO, LABRH    Drug/Infectious Status (If Applicable):  No results found for: HIV, HEPCAB    COVID-19 Screening (If Applicable):   Lab Results   Component Value Date    COVID19 Not Detected 04/09/2021           Anesthesia Evaluation  Patient summary reviewed no history of anesthetic complications:   Airway: Mallampati: II  TM distance: >3 FB   Neck ROM: full  Mouth opening: > = 3 FB Dental: normal exam         Pulmonary:Negative Pulmonary ROS and normal exam  breath sounds clear to auscultation                             Cardiovascular:Negative CV ROS  Exercise tolerance: good (>4 METS),   (+) hypertension:, angina:, CAD: obstructive, CABG/stent: no interval change,         Rhythm: regular  Rate: normal                    Neuro/Psych:   Negative Neuro/Psych ROS              GI/Hepatic/Renal: Neg GI/Hepatic/Renal ROS            Endo/Other: Negative Endo/Other ROS   (+) hypothyroidism::., .                 Abdominal:           Vascular: negative vascular ROS.  + PVD, aortic or cerebral, . Pre-Operative Diagnosis: Complex tear of medial meniscus of left knee as current injury, initial encounter [S83.900A]    80 y.o.   BMI:  Body mass index is 26.69 kg/m².      Vitals:    04/09/21 1046 04/13/21 1318   BP:  122/74   Pulse:  61   Resp:  16   Temp:  97 °F (36.1 °C)   TempSrc:  Temporal   SpO2:  94%   Weight: 181 lb (82.1 kg) 170 lb 6.4 oz (77.3 kg)   Height: 5' 7\" (1.702 m) 5' 7\" (1.702 m)       Allergies   Allergen Reactions    Penicillins Shortness Of Breath    Statins Other (See Comments)     Muscle pain       Social History     Tobacco Use    Smoking status: Never Smoker    Smokeless tobacco: Never Used   Substance Use Topics    Alcohol use: Yes     Alcohol/week: 10.0 - 14.0 standard drinks     Types: 10 - 14 Cans of beer per week       LABS:    CBC  Lab Results   Component Value Date/Time    WBC 8.9 03/11/2019 08:34 AM    HGB 15.2 03/11/2019 08:34 AM    HCT 45.1 03/11/2019 08:34 AM     03/11/2019 08:34 AM     RENAL  Lab Results   Component Value Date/Time     04/12/2021 10:53 AM    K 5.1 04/12/2021 10:53 AM     04/12/2021 10:53 AM    CO2 26 04/12/2021 10:53 AM    BUN 12 04/12/2021 10:53 AM    CREATININE 0.7 (L) 04/12/2021 10:53 AM    GLUCOSE 103 (H) 04/12/2021 10:53 AM     COAGS  Lab Results   Component Value Date/Time    PROTIME 11.4 03/24/2015 11:05 AM    INR 1.05 03/24/2015 11:05 AM    APTT 30.7 08/31/2012 09:45 PM     EKG 4/12/21  Marked sinus  Bradycardia   BORDERLINE RHYTHM    Stress test 3/2/21  Summary    There is normal isotope uptake at stress and rest. There is no evidence of    myocardial ischemia or scar. LV function is normal with uniform wall motion    and ejection fraction of 67%. Low risk study. Anesthesia Plan      general     ASA 3     (I discussed with the patient the risks and benefits of PIV, anesthesia, IV Narcotics, PACU. All questions were answered the patient agrees with the plan and wishes to proceed)  Induction: intravenous.                           Shefali Borjas MD   4/13/2021

## 2021-04-13 NOTE — PROGRESS NOTES
Updated family --   Discharge instructions reviewed with wife - instructed wife and pt when next pain medicine is due  Pain slowly improving  Eating and drinking /drsg unchanged  Advanced pt from lying to sitting without adverse event/pt denies complaints of dizziness/ponv/ RESP  WNL/ surgical drsg/circulation checks on operative limb unchanged from my  last pacu assessment entry     Pt states pain is at a tolerable level-4     instructed pt if no void in 8 hours contact physician or go to ER    Discharge instructions reviewed with patient/responsible adult and understanding verbalized. Discharge instructions signed and copies given. Rx       Oxycodone              Given to pts responsible adult/instructed not to drive/ingest alcohol while taking narcotic medications. Instructed pt/family member Last dose of narcotics given in recovery room was          Percocet     @      1707  (+ 1mg diladid) pm  . Medication information  sheet given to pt/family-all questions answered     If you are taking any anxioytics  or additional pain medicine please confirm with you ordering physician before  you start a new medicine      Please follow narcotic administration as prescribed by your surgeon- any questions call your surgeon. If you have any problems contact your surgeon and  Go to the emergency room.     Operative drsg unchanged from my initial pacu assessment

## 2021-04-13 NOTE — PROGRESS NOTES
Advancing hob without compaints  Tolerating Drinking and eating : Attempted to implement non pharmacological methods  of pain management-repositioned/ ice in place   Medicated for pain with ivp dilaudid/po percocet   No ponv  Pt alert and appropriate  Respirations  Easy/ unlabored/ no Chest pain or SOB   Surgical drsg unchanged from initial assessment  Circulation checks on operative limb unchanged from last entry in pacu

## 2021-04-15 ENCOUNTER — TELEPHONE (OUTPATIENT)
Dept: FAMILY MEDICINE CLINIC | Age: 71
End: 2021-04-15

## 2021-04-15 NOTE — TELEPHONE ENCOUNTER
Preet[facility] Transitions Initial Follow Up Call    Outreach made within 2 business days of discharge: Yes    Patient: Donna Venegas Patient : 1950   MRN: <[id number]>  Reason for Admission: There are no discharge diagnoses documented for the most recent discharge. Discharge Date: 21       Spoke with: Mann Riddle    Discharge department/facility: [facility] Patient Contact:  Was patient able to fill all prescriptions: Yes  Was patient instructed to bring all medications to the follow-up visit:   Is patient taking all medications as directed in the discharge summary?  Yes  Does patient understand their discharge instructions: Yes  Does patient have questions or concerns that need addressed prior to 7-14 day follow up office visit: no    Scheduled appointment with PCP within 7-14 days    Follow Up  Future Appointments   Date Time Provider Melissa Quiroga   10/4/2021  8:00 AM SCHEDULE, TOAN Mak MA

## 2021-06-25 RX ORDER — LISINOPRIL 10 MG/1
TABLET ORAL
Qty: 90 TABLET | Refills: 1 | Status: SHIPPED | OUTPATIENT
Start: 2021-06-25 | End: 2021-10-04 | Stop reason: SDUPTHER

## 2021-10-04 ENCOUNTER — OFFICE VISIT (OUTPATIENT)
Dept: FAMILY MEDICINE CLINIC | Age: 71
End: 2021-10-04
Payer: MEDICARE

## 2021-10-04 VITALS
DIASTOLIC BLOOD PRESSURE: 86 MMHG | WEIGHT: 167 LBS | BODY MASS INDEX: 27.82 KG/M2 | SYSTOLIC BLOOD PRESSURE: 126 MMHG | TEMPERATURE: 97.8 F | HEIGHT: 65 IN | HEART RATE: 57 BPM | OXYGEN SATURATION: 98 %

## 2021-10-04 DIAGNOSIS — E03.9 HYPOTHYROIDISM, UNSPECIFIED TYPE: ICD-10-CM

## 2021-10-04 DIAGNOSIS — E78.2 MIXED HYPERLIPIDEMIA: ICD-10-CM

## 2021-10-04 DIAGNOSIS — Z23 NEED FOR INFLUENZA VACCINATION: Primary | ICD-10-CM

## 2021-10-04 DIAGNOSIS — R73.03 PREDIABETES: ICD-10-CM

## 2021-10-04 DIAGNOSIS — Z00.00 ROUTINE GENERAL MEDICAL EXAMINATION AT A HEALTH CARE FACILITY: ICD-10-CM

## 2021-10-04 DIAGNOSIS — R73.02 IGT (IMPAIRED GLUCOSE TOLERANCE): ICD-10-CM

## 2021-10-04 DIAGNOSIS — I10 ESSENTIAL HYPERTENSION: ICD-10-CM

## 2021-10-04 LAB
A/G RATIO: 1.7 (ref 1.1–2.2)
ALBUMIN SERPL-MCNC: 4.3 G/DL (ref 3.4–5)
ALP BLD-CCNC: 64 U/L (ref 40–129)
ALT SERPL-CCNC: 16 U/L (ref 10–40)
ANION GAP SERPL CALCULATED.3IONS-SCNC: 9 MMOL/L (ref 3–16)
AST SERPL-CCNC: 18 U/L (ref 15–37)
BASOPHILS ABSOLUTE: 0 K/UL (ref 0–0.2)
BASOPHILS RELATIVE PERCENT: 0.5 %
BILIRUB SERPL-MCNC: 0.6 MG/DL (ref 0–1)
BUN BLDV-MCNC: 10 MG/DL (ref 7–20)
CALCIUM SERPL-MCNC: 9.1 MG/DL (ref 8.3–10.6)
CHLORIDE BLD-SCNC: 104 MMOL/L (ref 99–110)
CHOLESTEROL, TOTAL: 132 MG/DL (ref 0–199)
CO2: 25 MMOL/L (ref 21–32)
CREAT SERPL-MCNC: 0.6 MG/DL (ref 0.8–1.3)
EOSINOPHILS ABSOLUTE: 0.1 K/UL (ref 0–0.6)
EOSINOPHILS RELATIVE PERCENT: 3.1 %
GFR AFRICAN AMERICAN: >60
GFR NON-AFRICAN AMERICAN: >60
GLOBULIN: 2.5 G/DL
GLUCOSE BLD-MCNC: 109 MG/DL (ref 70–99)
HCT VFR BLD CALC: 43.9 % (ref 40.5–52.5)
HDLC SERPL-MCNC: 48 MG/DL (ref 40–60)
HEMOGLOBIN: 14.6 G/DL (ref 13.5–17.5)
LDL CHOLESTEROL CALCULATED: 73 MG/DL
LYMPHOCYTES ABSOLUTE: 1.2 K/UL (ref 1–5.1)
LYMPHOCYTES RELATIVE PERCENT: 27 %
MCH RBC QN AUTO: 31.6 PG (ref 26–34)
MCHC RBC AUTO-ENTMCNC: 33.3 G/DL (ref 31–36)
MCV RBC AUTO: 94.6 FL (ref 80–100)
MONOCYTES ABSOLUTE: 0.4 K/UL (ref 0–1.3)
MONOCYTES RELATIVE PERCENT: 8.9 %
NEUTROPHILS ABSOLUTE: 2.6 K/UL (ref 1.7–7.7)
NEUTROPHILS RELATIVE PERCENT: 60.5 %
PDW BLD-RTO: 13 % (ref 12.4–15.4)
PLATELET # BLD: 149 K/UL (ref 135–450)
PMV BLD AUTO: 7.9 FL (ref 5–10.5)
POTASSIUM SERPL-SCNC: 4.6 MMOL/L (ref 3.5–5.1)
RBC # BLD: 4.64 M/UL (ref 4.2–5.9)
SODIUM BLD-SCNC: 138 MMOL/L (ref 136–145)
T4 FREE: 1.1 NG/DL (ref 0.9–1.8)
TOTAL PROTEIN: 6.8 G/DL (ref 6.4–8.2)
TRIGL SERPL-MCNC: 54 MG/DL (ref 0–150)
TSH SERPL DL<=0.05 MIU/L-ACNC: 3.23 UIU/ML (ref 0.27–4.2)
VLDLC SERPL CALC-MCNC: 11 MG/DL
WBC # BLD: 4.3 K/UL (ref 4–11)

## 2021-10-04 PROCEDURE — G0439 PPPS, SUBSEQ VISIT: HCPCS | Performed by: FAMILY MEDICINE

## 2021-10-04 PROCEDURE — G0008 ADMIN INFLUENZA VIRUS VAC: HCPCS | Performed by: FAMILY MEDICINE

## 2021-10-04 PROCEDURE — 90694 VACC AIIV4 NO PRSRV 0.5ML IM: CPT | Performed by: FAMILY MEDICINE

## 2021-10-04 RX ORDER — LEVOTHYROXINE SODIUM 0.05 MG/1
TABLET ORAL
Qty: 90 TABLET | Refills: 0 | Status: SHIPPED | OUTPATIENT
Start: 2021-10-04 | End: 2022-03-17

## 2021-10-04 RX ORDER — LISINOPRIL 10 MG/1
TABLET ORAL
Qty: 90 TABLET | Refills: 0 | Status: SHIPPED | OUTPATIENT
Start: 2021-10-04 | End: 2021-12-28 | Stop reason: SDUPTHER

## 2021-10-04 ASSESSMENT — LIFESTYLE VARIABLES
HOW OFTEN DO YOU HAVE A DRINK CONTAINING ALCOHOL: 3
HOW OFTEN DURING THE LAST YEAR HAVE YOU FOUND THAT YOU WERE NOT ABLE TO STOP DRINKING ONCE YOU HAD STARTED: 0
HOW OFTEN DURING THE LAST YEAR HAVE YOU HAD A FEELING OF GUILT OR REMORSE AFTER DRINKING: 0
AUDIT-C TOTAL SCORE: 3
HAVE YOU OR SOMEONE ELSE BEEN INJURED AS A RESULT OF YOUR DRINKING: 0
HOW OFTEN DURING THE LAST YEAR HAVE YOU FAILED TO DO WHAT WAS NORMALLY EXPECTED FROM YOU BECAUSE OF DRINKING: 0
HOW OFTEN DURING THE LAST YEAR HAVE YOU NEEDED AN ALCOHOLIC DRINK FIRST THING IN THE MORNING TO GET YOURSELF GOING AFTER A NIGHT OF HEAVY DRINKING: 0
HOW MANY STANDARD DRINKS CONTAINING ALCOHOL DO YOU HAVE ON A TYPICAL DAY: 0
HOW OFTEN DURING THE LAST YEAR HAVE YOU BEEN UNABLE TO REMEMBER WHAT HAPPENED THE NIGHT BEFORE BECAUSE YOU HAD BEEN DRINKING: 0
HAS A RELATIVE, FRIEND, DOCTOR, OR ANOTHER HEALTH PROFESSIONAL EXPRESSED CONCERN ABOUT YOUR DRINKING OR SUGGESTED YOU CUT DOWN: 0
AUDIT TOTAL SCORE: 3
HOW OFTEN DO YOU HAVE SIX OR MORE DRINKS ON ONE OCCASION: 0

## 2021-10-04 ASSESSMENT — PATIENT HEALTH QUESTIONNAIRE - PHQ9
1. LITTLE INTEREST OR PLEASURE IN DOING THINGS: 0
SUM OF ALL RESPONSES TO PHQ9 QUESTIONS 1 & 2: 0
2. FEELING DOWN, DEPRESSED OR HOPELESS: 0
SUM OF ALL RESPONSES TO PHQ QUESTIONS 1-9: 0

## 2021-10-04 NOTE — PATIENT INSTRUCTIONS
Personalized Preventive Plan for Nelda Lukas - 10/4/2021  Medicare offers a range of preventive health benefits. Some of the tests and screenings are paid in full while other may be subject to a deductible, co-insurance, and/or copay. Some of these benefits include a comprehensive review of your medical history including lifestyle, illnesses that may run in your family, and various assessments and screenings as appropriate. After reviewing your medical record and screening and assessments performed today your provider may have ordered immunizations, labs, imaging, and/or referrals for you. A list of these orders (if applicable) as well as your Preventive Care list are included within your After Visit Summary for your review. Other Preventive Recommendations:    · A preventive eye exam performed by an eye specialist is recommended every 1-2 years to screen for glaucoma; cataracts, macular degeneration, and other eye disorders. · A preventive dental visit is recommended every 6 months. · Try to get at least 150 minutes of exercise per week or 10,000 steps per day on a pedometer . · Order or download the FREE \"Exercise & Physical Activity: Your Everyday Guide\" from [employer]. Call 6-400.913.5482 or search [employer] online. · You need 0915-9485 mg of calcium and 3845-3923 IU of vitamin D per day. It is possible to meet your calcium requirement with diet alone, but a vitamin D supplement is usually necessary to meet this goal.  · When exposed to the sun, use a sunscreen that protects against both UVA and UVB radiation with an SPF of 30 or greater. Reapply every 2 to 3 hours or after sweating, drying off with a towel, or swimming. · Always wear a seat belt when traveling in a car. Always wear a helmet when riding a bicycle or motorcycle.

## 2021-10-04 NOTE — PROGRESS NOTES
Vaccine Information Sheet, \"Influenza - Inactivated\"  given to Pancho Kaye, or parent/legal guardian of  Pancho Kaye and verbalized understanding. Patient responses:    Have you ever had a reaction to a flu vaccine? No  Are you able to eat eggs without adverse effects? Yes  Do you have any current illness? No  Have you ever had Guillian [place] Syndrome? No  Have you had any other vaccines in the last two weeks? No    Flu vaccine given per order. Please see immunization tab. Medicare Annual Wellness Visit  Name: Gabo Napier Date: 10/4/2021   MRN: <[id number]> Sex: Male   Age: 80 y.o. Ethnicity: Non- / Non    : 1950 Race: White (non-)      Pancho Kaye is here for No chief complaint on file. Screenings for behavioral, psychosocial and functional/safety risks, and cognitive dysfunction are all negative except as indicated below. These results, as well as other patient data from the [address] form, are documented in Flowsheets linked to this Encounter. Allergies   Allergen Reactions    Statins Other (See Comments)     Muscle pain  Other reaction(s): Other (See Comments)  Muscle pain       Prior to Visit Medications    Medication Sig Taking? Authorizing Provider   lisinopril (PRINIVIL;ZESTRIL) 10 MG tablet TAKE 1 TABLET BY MOUTH EVERY DAY Yes Dyanne Schaumann, MD   simvastatin (ZOCOR) 20 MG tablet Take 1 tablet by mouth nightly Yes Maximo Bennett MD   clopidogrel (PLAVIX) 75 MG tablet Take 1 tablet by mouth daily Yes Maximo Bennett MD   nitroGLYCERIN (NITROSTAT) 0.4 MG SL tablet Place 1 tablet under the tongue every 5 minutes as needed for Chest pain up to max of 3 total doses. If no relief after 1 dose, call 911.  Yes Maximo Bennett MD   levothyroxine (SYNTHROID) 50 MCG tablet TAKE 1 TABLET BY MOUTH EVERY DAY Yes Mery Greene MD   Omega-3 Fatty Acids (OMEGA-3 FISH OIL) 1000 MG CAPS Take by mouth daily  Yes Historical Provider, MD   aspirin 81 MG tablet Take 81 mg by mouth daily. Yes Historical Provider, MD   vitamin D (CHOLECALCIFEROL) 1000 UNIT TABS tablet Take 1,000 Units by mouth daily. Yes Historical Provider, MD       Past Medical History:   Diagnosis Date    Amnesia 9/1/2012    Arthritis 3/31/2011    Benign positional vertigo 3/31/2011    CAD (coronary artery disease)     Carotid artery stenosis 8/18/2014    Heart murmur 1970    HTN (hypertension) 3/31/2011    Hyperlipidemia     Hypothyroid 3/2011    Neck pain 3/31/2011    Tubular adenoma of colon        Past Surgical History:   Procedure Laterality Date    COLONOSCOPY      COLONOSCOPY  11/17/2020    COLONOSCOPY SUBMUCOSAL/BOTOX INJECTION to control hemorrhage Dr Bindu Quinn 11/17/20    COLONOSCOPY N/A 11/17/2020    COLON W/ANES.  (13:00) performed by Saqib Alberto MD at [facility] COLONOSCOPY N/A 11/17/2020    COLONOSCOPY CONTROL HEMORRHAGE performed by Saqib Alberto MD at [address] [place] [address]  11/17/2020    COLONOSCOPY SUBMUCOSAL/BOTOX INJECTION performed by [facility]qib [facility] at [facility]  03/25/2015    DESx1 mLAD xience alpine 3x8    DIAGNOSTIC CARDIAC CATH LAB PROCEDURE      ENDOSCOPY, COLON, DIAGNOSTIC      EYE SURGERY      calcium deposits removed    HEMORRHOID SURGERY  1988    HERNIA REPAIR      KNEE ARTHROSCOPY Left 4/13/2021    VIDEO ARTHROSCOPY LEFT KNEE, PARTIAL MEDIAL MENISCECTOMY WITH MENISCAL REPAIR performed by Garry Arteaga MD at [address] [place] [address] Left     above hip-benign       Family History   Problem Relation Age of Onset    Arthritis Mother     Heart Disease Father     Cancer Sister        CareTeam (Including outside providers/suppliers regularly involved in providing care):   Patient Care Team:  Angela Amaya MD as PCP - Ligia Jin MD as PCP - [facility] Empaneled Provider  Natalee Paul MD as Consulting Physician (Cardiology)  Gagan Ortega MD as Consulting Physician (Pulmonology)    Wt Readings from Last 3 Encounters:   10/04/21 167 lb (75.8 kg)   04/13/21 170 lb 6.4 oz (77.3 kg)   04/12/21 170 lb 6.4 oz (77.3 kg)     Vitals:    10/04/21 0819   BP: 126/86   Site: Right Upper Arm   Position: Sitting   Cuff Size: Small Adult   Pulse: 57   Temp: 97.8 °F (36.6 °C)   TempSrc: Oral   SpO2: 98%   Weight: 167 lb (75.8 kg)   Height: 5' 4.57\" (1.64 m)     Body mass index is 28.16 kg/m². Based upon direct observation of the patient, evaluation of cognition reveals recent and remote memory intact. Patient's complete Health Risk Assessment and screening values have been reviewed and are found in Flowsheets. The following problems were reviewed today and where indicated follow up appointments were made and/or referrals ordered. Positive Risk Factor Screenings with Interventions:     Fall Risk:  Timed Up and Go Test > 12 seconds? (Complete if either Fall Risk answers are Yes): no  2 or more falls in past year?: no  Fall with injury in past year?: (!) yes  Fall Risk Interventions:    · Home safety tips provided  · Patient declines any further evaluation/treatment for this issue    Cognitive:  Clock Drawing Test (CDT) Score: Normal (\"WORLD\" backwards)  Total Score Interpretation: Positive Mini-Cog  Did the patient refuse to take the cognition test?: No  Cognitive Impairment Interventions:  · Patient declines any further evaluation/treatment for cognitive impairment       General Health and ACP:  General  In general, how would you say your health is?: Good  In the past 7 days, have you experienced any of the following?  New or Increased Pain, New or Increased Fatigue, Loneliness, Social Isolation, Stress or Anger?: None of These  Do you get the social and emotional support that you need?: Yes  Do you have a Living Will?: Yes  Advance Directives     Power of  Living Will ACP-Advance Directive ACP-Power of [employer] Not on File Not on File Not on File Not on File      General Health Risk Interventions:  · No Living Will: ACP documents already completed- patient asked to provide copy to the office      Safety:  Safety  Do you have working smoke detectors?: Yes  Have all throw rugs been removed or fastened?: Yes  Do you have non-slip mats or surfaces in all bathtubs/showers?: Yes  Do all of your stairways have a railing or banister?: Yes  Are your doorways, halls and stairs free of clutter?: Yes  Do you always fasten your seatbelt when you are in a car?: (!) No  Safety Interventions:  · Patient declines any further evaluation/treatment for this issue     Personalized Preventive Plan   Current Health Maintenance Status  Immunization History   Administered Date(s) Administered    Influenza 10/30/2013    Influenza Vaccine, unspecified formulation 10/30/2013    Influenza Virus Vaccine 10/30/2013    Influenza, High Dose (Fluzone 65 yrs and older) 09/14/2016, 09/14/2017, 11/06/2017, 09/01/2018, 11/10/2018, 11/11/2019    Influenza, Quadv, adjuvanted, 65 yrs +, IM, PF (Fluad) 10/01/2020    Pneumococcal Conjugate 13-valent ([id number]) 02/26/2016    Pneumococcal Polysaccharide ([id number]) 03/03/2017    Td vaccine (adult) 01/01/2009    Td, unspecified formulation 01/01/2009    Tdap (Boostrix, Adacel) 08/23/2021    Zoster Live (Zostavax) 02/29/2016    Zoster Recombinant (Shingrix) 10/21/2020        Health Maintenance   Topic Date Due    COVID-19 Vaccine (1) Never done   [employer] Visit (AWV)  Never done    Shingles Vaccine (3 of 3) 12/16/2020    Flu vaccine (1) 09/01/2021    TSH testing  09/11/2021    A1C test (Diabetic or Prediabetic)  10/01/2021    Lipid screen  09/11/2021    Potassium monitoring  04/12/2022    Creatinine monitoring  04/12/2022    Colon cancer screen colonoscopy  11/18/2022    DTaP/Tdap/Td vaccine (2 - Td or Tdap) 08/23/2031    Pneumococcal 65+ years Vaccine  Completed    Hepatitis C screen  Completed    Hepatitis A vaccine  Aged Out    Hepatitis B vaccine  Aged Out    Hib vaccine  Aged Out    Meningococcal (ACWY) vaccine  Aged Out     Recommendations for [employer] Due: see orders and patient instructions/AVS.  . Recommended screening schedule for the next 5-10 years is provided to the patient in written form: see Patient Instructions/AVS.    Musa PAVON LPN, 73/3/6258, performed the documented evaluation under the direct supervision of the attending physician. This encounter was performed under Ros miller MDs, direct supervision, 10/4/2021.

## 2021-10-05 LAB
ESTIMATED AVERAGE GLUCOSE: 122.6 MG/DL
HBA1C MFR BLD: 5.9 %

## 2021-10-06 ENCOUNTER — TELEPHONE (OUTPATIENT)
Dept: FAMILY MEDICINE CLINIC | Age: 71
End: 2021-10-06

## 2021-10-06 NOTE — TELEPHONE ENCOUNTER
----- Message from Pina Lyn sent at 10/6/2021  3:40 PM EDT -----  Subject: Results Request    QUESTIONS  Which lab or imaging result is the patient calling about? blood work test   results   Which provider ordered the test? Perry Mckeon   At what location was the test performed? Date the test was performed? 2021-10-04  Additional Information for Provider? Lor Mejia, was in office and wanted   test results from blood work on 10/4/2021 OK to leave message on voicemail  ---------------------------------------------------------------------------  --------------  [address]  What is the best way for the office to contact you? OK to leave message on   voicemail  Preferred Call Back Phone Number?  [phone]

## 2021-12-28 ENCOUNTER — OFFICE VISIT (OUTPATIENT)
Dept: FAMILY MEDICINE CLINIC | Age: 71
End: 2021-12-28
Payer: MEDICARE

## 2021-12-28 VITALS
HEIGHT: 65 IN | HEART RATE: 58 BPM | BODY MASS INDEX: 28.66 KG/M2 | TEMPERATURE: 97.8 F | DIASTOLIC BLOOD PRESSURE: 64 MMHG | OXYGEN SATURATION: 94 % | WEIGHT: 172 LBS | SYSTOLIC BLOOD PRESSURE: 96 MMHG

## 2021-12-28 DIAGNOSIS — E03.9 HYPOTHYROIDISM, UNSPECIFIED TYPE: ICD-10-CM

## 2021-12-28 DIAGNOSIS — Z12.5 SCREENING PSA (PROSTATE SPECIFIC ANTIGEN): ICD-10-CM

## 2021-12-28 DIAGNOSIS — E78.2 MIXED HYPERLIPIDEMIA: ICD-10-CM

## 2021-12-28 DIAGNOSIS — I10 ESSENTIAL HYPERTENSION: Primary | ICD-10-CM

## 2021-12-28 LAB — PROSTATE SPECIFIC ANTIGEN: 0.11 NG/ML (ref 0–4)

## 2021-12-28 PROCEDURE — 99214 OFFICE O/P EST MOD 30 MIN: CPT | Performed by: FAMILY MEDICINE

## 2021-12-28 RX ORDER — LISINOPRIL 5 MG/1
TABLET ORAL
Qty: 90 TABLET | Refills: 1 | Status: SHIPPED | OUTPATIENT
Start: 2021-12-28 | End: 2022-02-09 | Stop reason: SDUPTHER

## 2021-12-29 NOTE — PROGRESS NOTES
Nelda Gaytan (:  1950) is a 80 y.o. male,Established patient, here for evaluation of the following chief complaint(s):  Hypertension and Hyperlipidemia         ASSESSMENT/PLAN:  1. Essential hypertension  -     lisinopril (PRINIVIL;ZESTRIL) 5 MG tablet; TAKE 1 TABLET BY MOUTH EVERY DAY, Disp-90 tablet, R-1Normal  BP on the low side and he is getting lightheaded. Will decrease lisinopril dose to 5 mg daily. 2. Mixed hyperlipidemia  This problem is well controlled. Pt should continue current medication at current dose. Will be due for repeat labs 2022. 3. Hypothyroidism, unspecified type  This problem is well controlled. Pt should continue current medication at current dose. Will be due for repeat labs 2022.   4. Screening PSA (prostate specific antigen)  -     PSA screening      Return in about 9 months (around 10/6/2022) for Medicare annual wellness visit, Hypertension. Subjective   SUBJECTIVE/OBJECTIVE:  HPI   Nelda Gaytan is a 80 y.o. male here for follow up on hypertension and hyperlipidemia. He also has hypothyroidism. Treatment Adherence:   Medication compliance:  compliant all of the time  Weight trend: up 5 lbs since last visit    Hypertension:  Home blood pressure monitoring: No.  He is adherent to a low sodium diet. Patient complains of lightheadedness at times. Antihypertensive medication side effects: lightheadedness. Use of agents associated with hypertension: thyroid hormones. Sodium (mmol/L)   Date Value   10/04/2021 138    BUN (mg/dL)   Date Value   10/04/2021 10    Glucose (mg/dL)   Date Value   10/04/2021 109 (H)      Potassium (mmol/L)   Date Value   10/04/2021 4.6    CREATININE (mg/dL)   Date Value   10/04/2021 0.6 (L)         Hyperlipidemia:  No problems on simvastatin (Zocor).      Lab Results   Component Value Date    TRIG 54 10/04/2021    HDL 48 10/04/2021    HDL 47 2011    LDLCALC 73 10/04/2021 Lab Results   Component Value Date    ALT 16 10/04/2021    AST 18 10/04/2021         He would like to have his PSA checked today. He has a friend who recently has been having an issue with prostate, and he would like to have this checked. He does report some nocturia. Review of Systems - per above       Objective   Physical Exam  Vitals and nursing note reviewed. Constitutional:       Appearance: Normal appearance. Cardiovascular:      Rate and Rhythm: Normal rate and regular rhythm. Heart sounds: Normal heart sounds. No murmur heard. No friction rub. No gallop. Pulmonary:      Effort: Pulmonary effort is normal.      Breath sounds: Normal breath sounds. No wheezing, rhonchi or rales. Musculoskeletal:      Right lower leg: No edema. Left lower leg: No edema. Neurological:      Mental Status: He is alert.    Psychiatric:         Speech: Speech normal.         Behavior: Behavior normal.            Lab Review   Office Visit on 10/04/2021   Component Date Value    WBC 10/04/2021 4.3     RBC 10/04/2021 4.64     Hemoglobin 10/04/2021 14.6     Hematocrit 10/04/2021 43.9     MCV 10/04/2021 94.6     MCH 10/04/2021 31.6     MCHC 10/04/2021 33.3     RDW 10/04/2021 13.0     Platelets 29/36/5606 149     MPV 10/04/2021 7.9     Neutrophils % 10/04/2021 60.5     Lymphocytes % 10/04/2021 27.0     Monocytes % 10/04/2021 8.9     Eosinophils % 10/04/2021 3.1     Basophils % 10/04/2021 0.5     Neutrophils Absolute 10/04/2021 2.6     Lymphocytes Absolute 10/04/2021 1.2     Monocytes Absolute 10/04/2021 0.4     Eosinophils Absolute 10/04/2021 0.1     Basophils Absolute 10/04/2021 0.0     Sodium 10/04/2021 138     Potassium 10/04/2021 4.6     Chloride 10/04/2021 104     CO2 10/04/2021 25     Anion Gap 10/04/2021 9     Glucose 10/04/2021 109*    BUN 10/04/2021 10     CREATININE 10/04/2021 0.6*    GFR Non- 10/04/2021 >60     GFR  10/04/2021 >60     Calcium 10/04/2021 9.1     Total Protein 10/04/2021 6.8     Albumin 10/04/2021 4.3     Albumin/Globulin Ratio 10/04/2021 1.7     Total Bilirubin 10/04/2021 0.6     Alkaline Phosphatase 10/04/2021 64     ALT 10/04/2021 16     AST 10/04/2021 18     Globulin 10/04/2021 2.5     Cholesterol, Total 10/04/2021 132     Triglycerides 10/04/2021 54     HDL 10/04/2021 48     LDL Calculated 10/04/2021 73     VLDL Cholesterol Calcula* 10/04/2021 11     Hemoglobin A1C 10/04/2021 5.9     eAG 10/04/2021 122.6     TSH 10/04/2021 3.23     T4 Free 10/04/2021 1.1          An electronic signature was used to authenticate this note.     --Sarahi Wright MD

## 2022-02-07 NOTE — PROGRESS NOTES
[address] Office Note  2022     Subjective:  Mr. Riri Cruz is here today for cardiology follow up of hyperlipidemia, CAD, HTN, no complaints today    [place]:  Today, he reports doing well. Patient with no complaints of chest pain, SOB, palpitations, dizziness, edema, or orthopnea/PND. 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 He is compliant with medications and tolerates them well. PMH  HTN, hyperlipidemia, hypothyroidism, murmur, CAD s/p C 3/25/15 with stent placement of mid LAD x2. Stress test 3.2.21 no ischemia low risk     Review of Systems:  12 point ROS negative in all areas as listed below except as in [place]  Constitutional, EENT,pulmonary, GI, , Musculoskeletal, skin, neurological, hematological, endocrine, Psychiatric    Reviewed past medical history, social, and family history. Non smoker, no alocohol  Family history- father had CABG surgery with a pacemaker. Started around 80.  at 80. Mother had arthritis. Brother had CABG 2 years ago. Father had HLD. Past Medical History:   Diagnosis Date    Amnesia 2012    Arthritis 3/31/2011    Benign positional vertigo 3/31/2011    CAD (coronary artery disease)     Carotid artery stenosis 2014    Heart murmur 1970    HTN (hypertension) 3/31/2011    Hyperlipidemia     Hypothyroid 3/2011    Neck pain 3/31/2011    Tubular adenoma of colon      Past Surgical History:   Procedure Laterality Date    COLONOSCOPY      COLONOSCOPY  2020    COLONOSCOPY SUBMUCOSAL/BOTOX INJECTION to control hemorrhage Dr Raiza Allen 20    COLONOSCOPY N/A 2020    COLON W/ANES.  (13:00) performed by Tom Austin MD at [address] [facility] COLONOSCOPY N/A 2020    COLONOSCOPY CONTROL HEMORRHAGE performed by Tom Austin MD at [address]  2020    COLONOSCOPY SUBMUCOSAL/BOTOX INJECTION performed by Tom Austin MD at [address]  03/25/2015    DESx1 mLAD xience alpine 3x8    DIAGNOSTIC CARDIAC CATH LAB PROCEDURE      ENDOSCOPY, COLON, DIAGNOSTIC      EYE SURGERY      calcium deposits removed    HEMORRHOID SURGERY  1988    HERNIA REPAIR      KNEE ARTHROSCOPY Left 4/13/2021    VIDEO ARTHROSCOPY LEFT KNEE, PARTIAL MEDIAL MENISCECTOMY WITH MENISCAL REPAIR performed by Pili Montes MD at [facility] Left     above hip-benign       Objective:   /60   Pulse 66   Ht 5' 4.5\" (1.638 m)   Wt 172 lb (78 kg)   SpO2 96%   BMI 29.07 kg/m²     Wt Readings from Last 3 Encounters:   02/09/22 172 lb (78 kg)   12/28/21 172 lb (78 kg)   10/04/21 167 lb (75.8 kg)       Physical Exam:  General: No Respiratory distress, appears well developed and well nourished. Eyes:  Sclera nonicteric  Nose/Sinuses:  negative findings: nose shows no deformity, asymmetry, or inflammation, nasal mucosa normal, septum midline with no perforation or bleeding  Back:  no pain to palpation  Joint:  no active joint inflammation  Musculoskeletal:  negative  Skin:  Warm and dry  Neck:  Negative for JVD and Carotid Bruits. Movements not painful. Chest:  Clear to auscultation, respiration easy  Cardiovascular:  RRR,  S1S2 normal,  No Murmur no rub or thrill. Abdomen non tender no mass  Extremities:   No edema, clubbing, cyanosis,  Pulses:  pedal pulses are normal.Neuro: intact    Medications:   Outpatient Encounter Medications as of 2/9/2022   Medication Sig Dispense Refill    clopidogrel (PLAVIX) 75 MG tablet Take 1 tablet by mouth daily 90 tablet 3    simvastatin (ZOCOR) 20 MG tablet Take 1 tablet by mouth nightly 90 tablet 3    lisinopril (PRINIVIL;ZESTRIL) 5 MG tablet TAKE 1 TABLET BY MOUTH EVERY DAY 90 tablet 3    nitroGLYCERIN (NITROSTAT) 0.4 MG SL tablet Place 1 tablet under the tongue every 5 minutes as needed for Chest pain up to max of 3 total doses.  If no relief after 1 dose, call 911. 25 tablet 1    levothyroxine (SYNTHROID) 50 MCG tablet TAKE 1 TABLET BY MOUTH EVERY DAY 90 tablet 0    Omega-3 Fatty Acids (OMEGA-3 FISH OIL) 1000 MG CAPS Take by mouth daily       aspirin 81 MG tablet Take 81 mg by mouth daily.  vitamin D (CHOLECALCIFEROL) 1000 UNIT TABS tablet Take 1,000 Units by mouth daily.  [DISCONTINUED] lisinopril (PRINIVIL;ZESTRIL) 5 MG tablet TAKE 1 TABLET BY MOUTH EVERY DAY 90 tablet 1    [DISCONTINUED] simvastatin (ZOCOR) 20 MG tablet Take 1 tablet by mouth nightly 90 tablet 3    [DISCONTINUED] clopidogrel (PLAVIX) 75 MG tablet Take 1 tablet by mouth daily 90 tablet 3    [DISCONTINUED] nitroGLYCERIN (NITROSTAT) 0.4 MG SL tablet Place 1 tablet under the tongue every 5 minutes as needed for Chest pain up to max of 3 total doses. If no relief after 1 dose, call 911. 25 tablet 3     No facility-administered encounter medications on file as of 2/9/2022. Lab Data:      Lab Results   Component Value Date    CHOL 132 10/04/2021    CHOL 132 09/11/2020    CHOL 120 03/11/2019     Lab Results   Component Value Date    TRIG 54 10/04/2021    TRIG 58 09/11/2020    TRIG 57 03/11/2019     Lab Results   Component Value Date    HDL 48 10/04/2021    HDL 46 09/11/2020    HDL 52 03/11/2019     Lab Results   Component Value Date    LDLCALC 73 10/04/2021    LDLCALC 74 09/11/2020    LDLCALC 57 03/11/2019     Lab Results   Component Value Date    LABVLDL 11 10/04/2021    LABVLDL 12 09/11/2020    LABVLDL 11 03/11/2019     No results found for: CHOLHDLRATIO  PT/INR: No results for input(s): PROTIME, INR in the last 72 hours. A1C:   Lab Results   Component Value Date    LABA1C 5.9 10/04/2021       IMAGING:   I have reviewed the following tests and documented in this encounter as follows:   Discussed with patient.     EKG 4/12/2021   Marked sinus  Bradycardia   BORDERLINE RHYTHM 47 bpm    Stress Test Myoview 3/2/2021   Summary    There is normal isotope uptake at stress and rest. There is no evidence of  myocardial ischemia or scar. LV function is normal with uniform wall motion    and ejection fraction of 67%. Low risk study. EKG 3/11/19  Sinus  Bradycardia   Low voltage with rightward P-axis and rotation     CATH 7/6/16  LM <20%  LAD 20% prox, mid stent patent  D1 50%  Cx 20-30%  RCA 50%  LVEF 55%    ECHO 7/6/16  Summary   Normal left ventricle size, wall thickness and systolic function with an   estimated ejection fraction of 55%. No regional wall motion abnormalities   are seen.   Diastolic filling parameters suggest grade I diastolic dysfunction.   The mitral valve is normal in structure and function. There is no   significant mitral regurgitation.   The aortic valve is normal in structure and function. There is trivial   aortic regurgitation.   The right ventricle is normal in size and function.   Tricuspid valve is structurally normal.   The tricuspid valve leaflets appear to open adequately.   There is trivial tricuspid regurgitation.   Systolic pulmonary artery pressure (SPAP) is normal and estimated at 32 mmHg   (RA pressure 3 mmHg).    CXR 7/5/16  FINDINGS: The lungs are without acute focal process. There is no effusion or pneumothorax. The cardiomediastinal silhouette is without acute process. The osseous structures are without acute process    CATH 3/25/15  LM: luminals  LAD: large LAD, wraps apex. long complex shelflike lesion in mid LAD of 80% associated with Ca+,   LCX: luminals. Small OM1, large OM2. RCA: dominant, difficult to engage, luminals    LVEDP 4  LVEF 65%  PCI of mid LAD  predialted with 2.5 x 20mm NC trek  Xience alpine 3 x 33mm   Xience 3 x 8mm  Then post with 3 x 20mm NC euphoria    ECHO 3/24/15  Summary  Normal left ventricle size, wall thickness and systolic function with an  estimated ejection fraction of 55%. No regional wall motion abnormalities  are seen. Diastolic filling parameters suggest grade I diastolic   dysfunction. Impaired  LV relaxation.   The mitral valve is normal in structure and function. There is trivial  mitral regurgitation. The aortic valve is normal in structure and function. There is no  significant aortic regurgitation. The aortic root is normal in size. Ascending aorta is slight enlarged at  3.7cm. Right ventricular systolic function is normal .  The right ventricle is enlarged. The right atrium is dilated. Assessment:  Encounter Diagnoses   Name Primary?  Stenosis of right carotid artery     Coronary artery disease involving native coronary artery of native heart without angina pectoris Yes    Essential hypertension     Mixed hyperlipidemia     Medication management        Diagnoses and all orders for this visit:    1. Coronary artery disease   -non obstructive by cath 2016   neg stress test march 2021 continue ASA plavix    2. Essential hypertension continue lisinopril  3. Mixed hyperlipidemia   -. HDL 48, LDL 73, TG 54 on simvastatin  4. Chest pain none   5. Neck pain none      Last lipids 10/4/2021  Labs reviewed in epic      Plan:  1. Medications reviewed. Refills warranted  2. Continue current course  3. Follow up in year. QUALITY MEASURES  1. Tobacco Cessation Counseling: NA  2. Retake of BP if >140/90:   NA  3. Documentation to PCP/referring for new patient:  Sent to PCP at close of office visit  4. CAD patient on anti-platelet: DAPT  5. CAD patient on STATIN therapy:  On  zocor  6. Patient with CHF and aFib on anticoagulation:  NA     This note was scribed in the presence of Surinder Engel MD by Luz Maria Beavers RN. I, Dr. Charli Webb, personally performed the services described in this documentation, as scribed by the above signed scribe in my presence. It is both accurate and complete to my knowledge. I agree with the details independently gathered by the clinical support staff, while the remaining scribed note accurately describes my personal service to the patient.         Margarita Rothman [id number]  [phone] [place] office  [phone] [facility]

## 2022-02-09 ENCOUNTER — OFFICE VISIT (OUTPATIENT)
Dept: CARDIOLOGY CLINIC | Age: 72
End: 2022-02-09
Payer: MEDICARE

## 2022-02-09 VITALS
SYSTOLIC BLOOD PRESSURE: 100 MMHG | HEART RATE: 66 BPM | BODY MASS INDEX: 28.66 KG/M2 | WEIGHT: 172 LBS | HEIGHT: 65 IN | OXYGEN SATURATION: 96 % | DIASTOLIC BLOOD PRESSURE: 60 MMHG

## 2022-02-09 DIAGNOSIS — I10 ESSENTIAL HYPERTENSION: ICD-10-CM

## 2022-02-09 DIAGNOSIS — Z79.899 MEDICATION MANAGEMENT: ICD-10-CM

## 2022-02-09 DIAGNOSIS — I65.21 STENOSIS OF RIGHT CAROTID ARTERY: ICD-10-CM

## 2022-02-09 DIAGNOSIS — E78.2 MIXED HYPERLIPIDEMIA: ICD-10-CM

## 2022-02-09 DIAGNOSIS — I25.10 CORONARY ARTERY DISEASE INVOLVING NATIVE CORONARY ARTERY OF NATIVE HEART WITHOUT ANGINA PECTORIS: Primary | ICD-10-CM

## 2022-02-09 PROCEDURE — 99214 OFFICE O/P EST MOD 30 MIN: CPT | Performed by: INTERNAL MEDICINE

## 2022-02-09 RX ORDER — LISINOPRIL 5 MG/1
TABLET ORAL
Qty: 90 TABLET | Refills: 3 | Status: SHIPPED | OUTPATIENT
Start: 2022-02-09

## 2022-02-09 RX ORDER — CLOPIDOGREL BISULFATE 75 MG/1
75 TABLET ORAL DAILY
Qty: 90 TABLET | Refills: 3 | Status: SHIPPED | OUTPATIENT
Start: 2022-02-09

## 2022-02-09 RX ORDER — NITROGLYCERIN 0.4 MG/1
0.4 TABLET SUBLINGUAL EVERY 5 MIN PRN
Qty: 25 TABLET | Refills: 1 | Status: SHIPPED | OUTPATIENT
Start: 2022-02-09

## 2022-02-09 RX ORDER — SIMVASTATIN 20 MG
20 TABLET ORAL NIGHTLY
Qty: 90 TABLET | Refills: 3 | Status: SHIPPED | OUTPATIENT
Start: 2022-02-09

## 2022-02-09 NOTE — PATIENT INSTRUCTIONS
Plan:  1. Medications reviewed. Refills warranted  2. Continue current course  3. Follow up in year.

## 2022-03-16 DIAGNOSIS — I10 ESSENTIAL HYPERTENSION: ICD-10-CM

## 2022-03-16 DIAGNOSIS — E03.9 HYPOTHYROIDISM, UNSPECIFIED TYPE: ICD-10-CM

## 2022-03-16 RX ORDER — LISINOPRIL 10 MG/1
TABLET ORAL
Qty: 90 TABLET | Refills: 0 | OUTPATIENT
Start: 2022-03-16

## 2022-03-16 NOTE — TELEPHONE ENCOUNTER
Refill Request     Last Seen: Last Seen Department: 12/28/2021  Last Seen by PCP: 12/28/2021    Last Written: 2/9/2022 for #90 tablet with #3 refills  Levothyroxine 10/4/2021 for #90 tablet with No Refills    Next Appointment:   Future Appointments   Date Time Provider Melissa Quiroga   10/3/2022 10:00 AM MD KATHARINE Parish  [place] [facility]       Future appointment scheduled      Requested Prescriptions     Pending Prescriptions Disp Refills    lisinopril (PRINIVIL;ZESTRIL) 10 MG tablet [Pharmacy Med Name: LISINOPRIL 10 MG TABLET] 90 tablet 0     Sig: TAKE 1 TABLET BY MOUTH EVERY DAY    levothyroxine (SYNTHROID) 50 MCG tablet [Pharmacy Med Name: LEVOTHYROXINE 50 MCG TABLET] 90 tablet 0     Sig: TAKE 1 TABLET BY MOUTH EVERY DAY

## 2022-03-17 RX ORDER — LEVOTHYROXINE SODIUM 0.05 MG/1
TABLET ORAL
Qty: 90 TABLET | Refills: 2 | Status: SHIPPED | OUTPATIENT
Start: 2022-03-17

## 2022-03-25 DIAGNOSIS — I10 ESSENTIAL HYPERTENSION: ICD-10-CM

## 2022-03-25 RX ORDER — LISINOPRIL 10 MG/1
TABLET ORAL
Qty: 90 TABLET | Refills: 0 | OUTPATIENT
Start: 2022-03-25

## 2022-03-25 NOTE — TELEPHONE ENCOUNTER
The patient's lisinopril was decreased to 5mg. Also, this was last filled in Feb with a 90 day supply and 3 refills.  Please find out if he actually wants a refill of a different medication

## 2022-03-25 NOTE — TELEPHONE ENCOUNTER
.  Refill Request     Last Seen: Last Seen Department: 12/28/2021  Last Seen by PCP: 12/28/2021    Last Written: 2-9-22 90 with 3     Next Appointment:   Future Appointments   Date Time Provider Melissa Kimber   10/3/2022 10:00 AM MD KATHARINE Phelps  [place] [facility]       Future appointment scheduled      Requested Prescriptions     Pending Prescriptions Disp Refills    lisinopril (PRINIVIL;ZESTRIL) 10 MG tablet [Pharmacy Med Name: LISINOPRIL 10 MG TABLET] 90 tablet 0     Sig: TAKE 1 TABLET BY MOUTH EVERY DAY

## 2022-03-29 NOTE — TELEPHONE ENCOUNTER
Called patient and he is not taking the 10MG, only 5MG. Does not need this or anything else at this time.

## 2022-11-02 ENCOUNTER — TELEMEDICINE (OUTPATIENT)
Dept: FAMILY MEDICINE CLINIC | Age: 72
End: 2022-11-02
Payer: MEDICARE

## 2022-11-02 DIAGNOSIS — Z00.00 MEDICARE ANNUAL WELLNESS VISIT, SUBSEQUENT: Primary | ICD-10-CM

## 2022-11-02 PROCEDURE — G0439 PPPS, SUBSEQ VISIT: HCPCS | Performed by: FAMILY MEDICINE

## 2022-11-02 PROCEDURE — 1123F ACP DISCUSS/DSCN MKR DOCD: CPT | Performed by: FAMILY MEDICINE

## 2022-11-02 SDOH — ECONOMIC STABILITY: FOOD INSECURITY: WITHIN THE PAST 12 MONTHS, THE FOOD YOU BOUGHT JUST DIDN'T LAST AND YOU DIDN'T HAVE MONEY TO GET MORE.: NEVER TRUE

## 2022-11-02 SDOH — ECONOMIC STABILITY: FOOD INSECURITY: WITHIN THE PAST 12 MONTHS, YOU WORRIED THAT YOUR FOOD WOULD RUN OUT BEFORE YOU GOT MONEY TO BUY MORE.: NEVER TRUE

## 2022-11-02 ASSESSMENT — LIFESTYLE VARIABLES
HAS A RELATIVE, FRIEND, DOCTOR, OR ANOTHER HEALTH PROFESSIONAL EXPRESSED CONCERN ABOUT YOUR DRINKING OR SUGGESTED YOU CUT DOWN: 0
HOW OFTEN DURING THE LAST YEAR HAVE YOU FOUND THAT YOU WERE NOT ABLE TO STOP DRINKING ONCE YOU HAD STARTED: 0
HOW OFTEN DURING THE LAST YEAR HAVE YOU FAILED TO DO WHAT WAS NORMALLY EXPECTED FROM YOU BECAUSE OF DRINKING: 0
HAVE YOU OR SOMEONE ELSE BEEN INJURED AS A RESULT OF YOUR DRINKING: 0
HOW OFTEN DO YOU HAVE A DRINK CONTAINING ALCOHOL: 4 OR MORE TIMES A WEEK
HOW OFTEN DURING THE LAST YEAR HAVE YOU NEEDED AN ALCOHOLIC DRINK FIRST THING IN THE MORNING TO GET YOURSELF GOING AFTER A NIGHT OF HEAVY DRINKING: 0
HOW OFTEN DURING THE LAST YEAR HAVE YOU BEEN UNABLE TO REMEMBER WHAT HAPPENED THE NIGHT BEFORE BECAUSE YOU HAD BEEN DRINKING: 0
HOW OFTEN DURING THE LAST YEAR HAVE YOU HAD A FEELING OF GUILT OR REMORSE AFTER DRINKING: 0
HOW MANY STANDARD DRINKS CONTAINING ALCOHOL DO YOU HAVE ON A TYPICAL DAY: 1 OR 2

## 2022-11-02 ASSESSMENT — PATIENT HEALTH QUESTIONNAIRE - PHQ9
SUM OF ALL RESPONSES TO PHQ QUESTIONS 1-9: 0
SUM OF ALL RESPONSES TO PHQ QUESTIONS 1-9: 0
SUM OF ALL RESPONSES TO PHQ9 QUESTIONS 1 & 2: 0
SUM OF ALL RESPONSES TO PHQ QUESTIONS 1-9: 0
2. FEELING DOWN, DEPRESSED OR HOPELESS: 0
1. LITTLE INTEREST OR PLEASURE IN DOING THINGS: 0
SUM OF ALL RESPONSES TO PHQ QUESTIONS 1-9: 0

## 2022-11-02 ASSESSMENT — SOCIAL DETERMINANTS OF HEALTH (SDOH): HOW HARD IS IT FOR YOU TO PAY FOR THE VERY BASICS LIKE FOOD, HOUSING, MEDICAL CARE, AND HEATING?: NOT HARD AT ALL

## 2022-11-02 NOTE — PROGRESS NOTES
Medicare Annual Wellness Visit    Oapl Medrano is here for Medicare AWV    Assessment & Plan   Medicare annual wellness visit, subsequent    Recommendations for Preventive Services Due: see orders and patient instructions/AVS.  Recommended screening schedule for the next 5-10 years is provided to the patient in written form: see Patient Instructions/AVS.     No follow-ups on file. Subjective       Patient's complete Health Risk Assessment and screening values have been reviewed and are found in Flowsheets. The following problems were reviewed today and where indicated follow up appointments were made and/or referrals ordered.     Positive Risk Factor Screenings with Interventions:             General Health and ACP:  General  In general, how would you say your health is?: Good  In the past 7 days, have you experienced any of the following: New or Increased Pain, New or Increased Fatigue, Loneliness, Social Isolation, Stress or Anger?: No  Do you get the social and emotional support that you need?: Yes  Do you have a Living Will?: Yes    Advance Directives       Power of  Living Will ACP-Advance Directive ACP-Power of     Not on File Not on File Not on File Not on File          General Health Risk Interventions:  No Living Will: ACP documents already completed- patient asked to provide copy to the office    Health Habits/Nutrition:  Physical Activity: Insufficiently Active    Days of Exercise per Week: 7 days    Minutes of Exercise per Session: 10 min     Have you lost any weight without trying in the past 3 months?: No     Have you seen the dentist within the past year?: (!) No  Health Habits/Nutrition Interventions:  Dental exam overdue:  patient encouraged to make appointment with his/her dentist    Hearing/Vision:  Do you or your family notice any trouble with your hearing that hasn't been managed with hearing aids?:  (has hearing aids)  Do you have difficulty driving, watching TV, or doing any of your daily activities because of your eyesight?: No  Have you had an eye exam within the past year?: (!) No  No results found. Hearing/Vision Interventions:  Vision concerns:  patient encouraged to make appointment with his/her eye specialist    Safety:  Do you have working smoke detectors?: Yes  Do you have any tripping hazards - loose or unsecured carpets or rugs?: No  Do you have any tripping hazards - clutter in doorways, halls, or stairs?: No  Do you have either shower bars, grab bars, non-slip mats or non-slip surfaces in your shower or bathtub?: Yes  Do all of your stairways have a railing or banister?: Yes  Do you always fasten your seatbelt when you are in a car?: (!) No (\" not always\")  Safety Interventions:  Patient declines any further evaluation/treatment for this issue           Objective      Patient-Reported Vitals  Patient-Reported Weight: 174lb  Patient-Reported Height: 5' 4.5\"      Patient did not monitor blood pressure at home       Allergies   Allergen Reactions    Statins Other (See Comments)     Muscle pain  Other reaction(s): Other (See Comments)  Muscle pain     Prior to Visit Medications    Medication Sig Taking? Authorizing Provider   levothyroxine (SYNTHROID) 50 MCG tablet TAKE 1 TABLET BY MOUTH EVERY DAY  Abdi Em MD   clopidogrel (PLAVIX) 75 MG tablet Take 1 tablet by mouth daily  Krunal Barbosa MD   simvastatin (ZOCOR) 20 MG tablet Take 1 tablet by mouth nightly  Krunal Barbosa MD   lisinopril (PRINIVIL;ZESTRIL) 5 MG tablet TAKE 1 TABLET BY MOUTH EVERY DAY  Krunal Barbosa MD   nitroGLYCERIN (NITROSTAT) 0.4 MG SL tablet Place 1 tablet under the tongue every 5 minutes as needed for Chest pain up to max of 3 total doses. If no relief after 1 dose, call 911. Krunal Barbosa MD   Omega-3 Fatty Acids (OMEGA-3 FISH OIL) 1000 MG CAPS Take by mouth daily   Historical Provider, MD   aspirin 81 MG tablet Take 81 mg by mouth daily.   Historical Provider, MD   vitamin D (CHOLECALCIFEROL) 1000 UNIT TABS tablet Take 1,000 Units by mouth daily. Historical Provider, MD Bhardwaj (Including outside providers/suppliers regularly involved in providing care):   Patient Care Team:  Lyn Blair MD as PCP - Balaji Alonso MD as PCP - [facility]  Juliane Funez MD as Consulting Physician (Cardiology)  Trena Mondragon MD as Consulting Physician (Pulmonology)     Reviewed and updated this visit:  Tobacco  Allergies  Med Hx  Surg Hx  Soc Hx  Fam Hx          Tree Half, was evaluated through a synchronous (real-time) audio-video encounter. The patient (or guardian if applicable) is aware that this is a billable service, which includes applicable co-pays. This Virtual Visit was conducted with patient's (and/or legal guardian's) consent. The visit was conducted pursuant to the emergency declaration under the [facility], [facility] authority and the [employer] Act. Patient identification was verified, and a caregiver was present when appropriate. The patient was located at Home: [address] [facility]. Provider was located at [place] [address] (Appt Dept): [address]  [address] [facility] [address] [place],  [address] [facility] [address]. Willam Larsen LPN, [birth year], performed the documented evaluation under the direct supervision of the attending physician. This encounter was performed under myJosue MDs, direct supervision, 11/2/2022.

## 2022-11-02 NOTE — PATIENT INSTRUCTIONS
Personalized Preventive Plan for Tamia Lieberman - 11/2/2022  Medicare offers a range of preventive health benefits. Some of the tests and screenings are paid in full while other may be subject to a deductible, co-insurance, and/or copay. Some of these benefits include a comprehensive review of your medical history including lifestyle, illnesses that may run in your family, and various assessments and screenings as appropriate. After reviewing your medical record and screening and assessments performed today your provider may have ordered immunizations, labs, imaging, and/or referrals for you. A list of these orders (if applicable) as well as your Preventive Care list are included within your After Visit Summary for your review. Other Preventive Recommendations:    A preventive eye exam performed by an eye specialist is recommended every 1-2 years to screen for glaucoma; cataracts, macular degeneration, and other eye disorders. A preventive dental visit is recommended every 6 months. Try to get at least 150 minutes of exercise per week or 10,000 steps per day on a pedometer . Order or download the FREE \"Exercise & Physical Activity: Your Everyday Guide\" from [employer]. Call 6-465.199.1737 or search [employer] online. You need 9536-4754 mg of calcium and 8695-9226 IU of vitamin D per day. It is possible to meet your calcium requirement with diet alone, but a vitamin D supplement is usually necessary to meet this goal.  When exposed to the sun, use a sunscreen that protects against both UVA and UVB radiation with an SPF of 30 or greater. Reapply every 2 to 3 hours or after sweating, drying off with a towel, or swimming. Always wear a seat belt when traveling in a car. Always wear a helmet when riding a bicycle or motorcycle.

## 2022-11-15 ENCOUNTER — OFFICE VISIT (OUTPATIENT)
Dept: FAMILY MEDICINE CLINIC | Age: 72
End: 2022-11-15
Payer: MEDICARE

## 2022-11-15 VITALS
WEIGHT: 176.4 LBS | SYSTOLIC BLOOD PRESSURE: 110 MMHG | BODY MASS INDEX: 30.11 KG/M2 | HEART RATE: 52 BPM | HEIGHT: 64 IN | DIASTOLIC BLOOD PRESSURE: 70 MMHG | OXYGEN SATURATION: 95 %

## 2022-11-15 DIAGNOSIS — R35.1 NOCTURIA: ICD-10-CM

## 2022-11-15 DIAGNOSIS — I65.21 STENOSIS OF RIGHT CAROTID ARTERY: ICD-10-CM

## 2022-11-15 DIAGNOSIS — I10 ESSENTIAL HYPERTENSION: ICD-10-CM

## 2022-11-15 DIAGNOSIS — I73.9 CLAUDICATION OF BOTH LOWER EXTREMITIES (HCC): ICD-10-CM

## 2022-11-15 DIAGNOSIS — E03.9 HYPOTHYROIDISM, UNSPECIFIED TYPE: ICD-10-CM

## 2022-11-15 DIAGNOSIS — R29.898 BILATERAL LEG WEAKNESS: Primary | ICD-10-CM

## 2022-11-15 DIAGNOSIS — E55.9 VITAMIN D INSUFFICIENCY: ICD-10-CM

## 2022-11-15 DIAGNOSIS — R25.2 LEG CRAMPING: ICD-10-CM

## 2022-11-15 DIAGNOSIS — R09.89 POOR CIRCULATION OF EXTREMITY: ICD-10-CM

## 2022-11-15 DIAGNOSIS — E78.2 MIXED HYPERLIPIDEMIA: ICD-10-CM

## 2022-11-15 DIAGNOSIS — I25.110 CORONARY ARTERY DISEASE INVOLVING NATIVE CORONARY ARTERY OF NATIVE HEART WITH UNSTABLE ANGINA PECTORIS (HCC): ICD-10-CM

## 2022-11-15 DIAGNOSIS — Z12.5 PROSTATE CANCER SCREENING: ICD-10-CM

## 2022-11-15 DIAGNOSIS — R73.02 IGT (IMPAIRED GLUCOSE TOLERANCE): ICD-10-CM

## 2022-11-15 LAB
A/G RATIO: 2.2 (ref 1.1–2.2)
ALBUMIN SERPL-MCNC: 4.6 G/DL (ref 3.4–5)
ALP BLD-CCNC: 62 U/L (ref 40–129)
ALT SERPL-CCNC: 16 U/L (ref 10–40)
ANION GAP SERPL CALCULATED.3IONS-SCNC: 12 MMOL/L (ref 3–16)
AST SERPL-CCNC: 17 U/L (ref 15–37)
BASOPHILS ABSOLUTE: 0 K/UL (ref 0–0.2)
BASOPHILS RELATIVE PERCENT: 0.4 %
BILIRUB SERPL-MCNC: 0.5 MG/DL (ref 0–1)
BUN BLDV-MCNC: 10 MG/DL (ref 7–20)
CALCIUM SERPL-MCNC: 9.2 MG/DL (ref 8.3–10.6)
CHLORIDE BLD-SCNC: 100 MMOL/L (ref 99–110)
CHOLESTEROL, TOTAL: 149 MG/DL (ref 0–199)
CO2: 27 MMOL/L (ref 21–32)
CREAT SERPL-MCNC: 0.6 MG/DL (ref 0.8–1.3)
EOSINOPHILS ABSOLUTE: 0.2 K/UL (ref 0–0.6)
EOSINOPHILS RELATIVE PERCENT: 3.8 %
FOLATE: 14.76 NG/ML (ref 4.78–24.2)
GFR SERPL CREATININE-BSD FRML MDRD: >60 ML/MIN/{1.73_M2}
GLUCOSE BLD-MCNC: 97 MG/DL (ref 70–99)
HCT VFR BLD CALC: 45.5 % (ref 40.5–52.5)
HDLC SERPL-MCNC: 48 MG/DL (ref 40–60)
HEMOGLOBIN: 15.2 G/DL (ref 13.5–17.5)
LDL CHOLESTEROL CALCULATED: 92 MG/DL
LYMPHOCYTES ABSOLUTE: 1.1 K/UL (ref 1–5.1)
LYMPHOCYTES RELATIVE PERCENT: 21.9 %
MAGNESIUM: 2.1 MG/DL (ref 1.8–2.4)
MCH RBC QN AUTO: 32 PG (ref 26–34)
MCHC RBC AUTO-ENTMCNC: 33.3 G/DL (ref 31–36)
MCV RBC AUTO: 95.9 FL (ref 80–100)
MONOCYTES ABSOLUTE: 0.4 K/UL (ref 0–1.3)
MONOCYTES RELATIVE PERCENT: 8.2 %
NEUTROPHILS ABSOLUTE: 3.3 K/UL (ref 1.7–7.7)
NEUTROPHILS RELATIVE PERCENT: 65.7 %
PDW BLD-RTO: 13.3 % (ref 12.4–15.4)
PLATELET # BLD: 178 K/UL (ref 135–450)
PMV BLD AUTO: 8.5 FL (ref 5–10.5)
POTASSIUM SERPL-SCNC: 4.2 MMOL/L (ref 3.5–5.1)
PROSTATE SPECIFIC ANTIGEN: 0.11 NG/ML (ref 0–4)
RBC # BLD: 4.75 M/UL (ref 4.2–5.9)
SODIUM BLD-SCNC: 139 MMOL/L (ref 136–145)
TOTAL CK: 121 U/L (ref 39–308)
TOTAL PROTEIN: 6.7 G/DL (ref 6.4–8.2)
TRIGL SERPL-MCNC: 46 MG/DL (ref 0–150)
TSH REFLEX: 2.74 UIU/ML (ref 0.27–4.2)
VITAMIN B-12: 346 PG/ML (ref 211–911)
VITAMIN D 25-HYDROXY: 38 NG/ML
VLDLC SERPL CALC-MCNC: 9 MG/DL
WBC # BLD: 5.1 K/UL (ref 4–11)

## 2022-11-15 PROCEDURE — 90694 VACC AIIV4 NO PRSRV 0.5ML IM: CPT | Performed by: FAMILY MEDICINE

## 2022-11-15 PROCEDURE — 3074F SYST BP LT 130 MM HG: CPT | Performed by: FAMILY MEDICINE

## 2022-11-15 PROCEDURE — 99214 OFFICE O/P EST MOD 30 MIN: CPT | Performed by: FAMILY MEDICINE

## 2022-11-15 PROCEDURE — 1123F ACP DISCUSS/DSCN MKR DOCD: CPT | Performed by: FAMILY MEDICINE

## 2022-11-15 PROCEDURE — G0008 ADMIN INFLUENZA VIRUS VAC: HCPCS | Performed by: FAMILY MEDICINE

## 2022-11-15 PROCEDURE — 3078F DIAST BP <80 MM HG: CPT | Performed by: FAMILY MEDICINE

## 2022-11-15 ASSESSMENT — ANXIETY QUESTIONNAIRES
IF YOU CHECKED OFF ANY PROBLEMS ON THIS QUESTIONNAIRE, HOW DIFFICULT HAVE THESE PROBLEMS MADE IT FOR YOU TO DO YOUR WORK, TAKE CARE OF THINGS AT HOME, OR GET ALONG WITH OTHER PEOPLE: NOT DIFFICULT AT ALL
6. BECOMING EASILY ANNOYED OR IRRITABLE: 0
1. FEELING NERVOUS, ANXIOUS, OR ON EDGE: 0
7. FEELING AFRAID AS IF SOMETHING AWFUL MIGHT HAPPEN: 0
GAD7 TOTAL SCORE: 0
2. NOT BEING ABLE TO STOP OR CONTROL WORRYING: 0
5. BEING SO RESTLESS THAT IT IS HARD TO SIT STILL: 0
4. TROUBLE RELAXING: 0
3. WORRYING TOO MUCH ABOUT DIFFERENT THINGS: 0

## 2022-11-15 ASSESSMENT — PATIENT HEALTH QUESTIONNAIRE - PHQ9
2. FEELING DOWN, DEPRESSED OR HOPELESS: 0
4. FEELING TIRED OR HAVING LITTLE ENERGY: 0
SUM OF ALL RESPONSES TO PHQ QUESTIONS 1-9: 0
1. LITTLE INTEREST OR PLEASURE IN DOING THINGS: 0
5. POOR APPETITE OR OVEREATING: 0
SUM OF ALL RESPONSES TO PHQ QUESTIONS 1-9: 0
SUM OF ALL RESPONSES TO PHQ QUESTIONS 1-9: 0
10. IF YOU CHECKED OFF ANY PROBLEMS, HOW DIFFICULT HAVE THESE PROBLEMS MADE IT FOR YOU TO DO YOUR WORK, TAKE CARE OF THINGS AT HOME, OR GET ALONG WITH OTHER PEOPLE: 0
SUM OF ALL RESPONSES TO PHQ9 QUESTIONS 1 & 2: 0
SUM OF ALL RESPONSES TO PHQ QUESTIONS 1-9: 0
7. TROUBLE CONCENTRATING ON THINGS, SUCH AS READING THE NEWSPAPER OR WATCHING TELEVISION: 0
9. THOUGHTS THAT YOU WOULD BE BETTER OFF DEAD, OR OF HURTING YOURSELF: 0
8. MOVING OR SPEAKING SO SLOWLY THAT OTHER PEOPLE COULD HAVE NOTICED. OR THE OPPOSITE, BEING SO FIGETY OR RESTLESS THAT YOU HAVE BEEN MOVING AROUND A LOT MORE THAN USUAL: 0
6. FEELING BAD ABOUT YOURSELF - OR THAT YOU ARE A FAILURE OR HAVE LET YOURSELF OR YOUR FAMILY DOWN: 0
3. TROUBLE FALLING OR STAYING ASLEEP: 0

## 2022-11-16 LAB
ESTIMATED AVERAGE GLUCOSE: 122.6 MG/DL
HBA1C MFR BLD: 5.9 %

## 2022-11-21 ASSESSMENT — ENCOUNTER SYMPTOMS
BACK PAIN: 0
RESPIRATORY NEGATIVE: 1
GASTROINTESTINAL NEGATIVE: 1

## 2022-11-21 NOTE — PROGRESS NOTES
Katlyn Mackay (:  1950) is a 71 y.o. male,Established patient, here for evaluation of the following chief complaint(s):  Hypertension (Fasting blood work), Extremity Weakness (Feels real week at times, then like legs are going to give out on him at times, worse on the left side, history of carotid stenosis), Hypothyroidism, Hyperlipidemia, Hyperglycemia, and Coronary Artery Disease         ASSESSMENT/PLAN:  1. Bilateral leg weakness  -     CBC with Auto Differential  -     Folate  -     Vitamin B12  -     XR LUMBAR SPINE (2-3 VIEWS); Future  -     VL DUP LOWER EXTREMITY ARTERIES BILATERAL; Future  2. Poor circulation of extremity  -     CBC with Auto Differential  -     Folate  -     Vitamin B12  -     VL DUP LOWER EXTREMITY ARTERIES BILATERAL; Future  3. Claudication of both lower extremities (HCC)  -     Folate  -     Vitamin B12  -     VL DUP LOWER EXTREMITY ARTERIES BILATERAL; Future  4. Coronary artery disease involving native coronary artery of native heart with unstable angina pectoris (HCC)  -     Folate  -     Vitamin B12  -     Comprehensive Metabolic Panel  -     Lipid Panel  5. Hypothyroidism, unspecified type  -     Folate  -     Vitamin B12  -     Comprehensive Metabolic Panel  -     TSH with Reflex  -     Lipid Panel  This problem is stable. Continue current meds until lab results obtain, will adjust dosing if necessary at that time. 6. Mixed hyperlipidemia  -     Folate  -     Vitamin B12  -     Comprehensive Metabolic Panel  -     TSH with Reflex  -     Lipid Panel  This problem is stable. Continue current meds until lab results obtain, will adjust dosing if necessary at that time. 7. Essential hypertension  -     Folate  -     Vitamin B12  This problem is well controlled. Pt should continue current medication at current dose. 8. IGT (impaired glucose tolerance)  -     Folate  -     Vitamin B12  -     Hemoglobin A1C  9.  Leg cramping  -     CBC with Auto Differential  - Folate  -     Vitamin B12  -     Vitamin D 25 Hydroxy  -     Comprehensive Metabolic Panel  -     VL DUP LOWER EXTREMITY ARTERIES BILATERAL; Future  -     CK  -     Magnesium  10. Stenosis of right carotid artery  -     Folate  -     Vitamin B12  -     VL DUP CAROTID BILATERAL; Future  11. Nocturia  -     Folate  -     Vitamin B12  -     PSA Screening  12. Prostate cancer screening  -     PSA Screening  13. Vitamin D insufficiency  -     Vitamin D 25 Hydroxy      No follow-ups on file. Subjective   SUBJECTIVE/OBJECTIVE:  Chief Complaint   Patient presents with    Hypertension     Fasting blood work    Extremity Weakness     Feels real week at times, then like legs are going to give out on him at times, worse on the left side, history of carotid stenosis    Hypothyroidism    Hyperlipidemia    Hyperglycemia    Coronary Artery Disease       HPI Jonathan Zepeda is a 71 y.o. male with HTN, HLP, hypothyroid, IGT, and CAD here today for routine follow up on those conditions and to get fasting blood work done. He reports the past 2 months he has had increasing weakness of both of his legs. He described an episode of recently meeting a friend for a meal at [facility] and when he got out of his vehicle in the parking lot, he felt his legs go weak. They felt like they were going to give out on him. He states this is worsened by activity, such as walking. It used to happen intermittently, but is now happening more frequently and with greater intensity. He reports the weakness is greater on the left side than the right, but occurs on both. He reports recently his feet have been increasing cold even when the temperature is not cold outside or in his house. He is worried he has poor circulation. He has a history of carotid stenosis and and also worries that this may be causing his leg weakness. He states he does not get weakness in his arms. He does not have associated back pain. He denies numbness or tingling. The problem only occurs with activity. Treatment Adherence:   Medication compliance:  compliant all of the time  Diet compliance:  compliant most of the time  Weight trend: increasing. He has gained 9 lbs in the past 13 months. Current exercise: lisa hay, works regularly at his home  Barriers: impairment:  physical: leg weakness    Hypertension:  Antihypertensive medication side effects: no medication side effects noted. Use of agents associated with hypertension: thyroid hormones. Sodium (mmol/L)   Date Value   11/15/2022 139    BUN (mg/dL)   Date Value   11/15/2022 10    Glucose (mg/dL)   Date Value   11/15/2022 97      Potassium (mmol/L)   Date Value   11/15/2022 4.2    Creatinine (mg/dL)   Date Value   11/15/2022 0.6 (L)         Hyperlipidemia:  No new myalgias or GI upset on simvastatin (Zocor). Lab Results   Component Value Date/Time    TRIG 46 11/15/2022 10:30 AM    HDL 48 11/15/2022 10:30 AM    HDL 47 12/16/2011 09:52 AM    LDLCALC 92 11/15/2022 10:30 AM     Lab Results   Component Value Date    ALT 16 11/15/2022    AST 17 11/15/2022         Hypothyroidism: Recent symptoms: none. Patient is  taking his medication consistently on an empty stomach. Lab Results   Component Value Date    TSH 3.23 10/04/2021    TSH 3.19 03/11/2019    TSH 3.12 03/09/2018        Review of Systems   Constitutional:  Negative for unexpected weight change. Respiratory: Negative. Cardiovascular: Negative. Gastrointestinal: Negative. Genitourinary:  Positive for frequency (at night). Musculoskeletal:  Positive for gait problem and myalgias (cramping in both legs). Negative for back pain. Neurological:  Positive for weakness. Negative for numbness.         Objective   Vitals:    11/15/22 0929   BP: 110/70   Site: Left Upper Arm   Position: Sitting   Cuff Size: Large Adult   Pulse: 52   SpO2: 95%   Weight: 176 lb 6.4 oz (80 kg)   Height: 5' 4\" (1.626 m)      Physical Exam  Vitals and nursing note reviewed. Neck:      Vascular: Normal carotid pulses. No carotid bruit or JVD. Cardiovascular:      Rate and Rhythm: Normal rate and regular rhythm. Heart sounds: Normal heart sounds. Pulmonary:      Effort: Pulmonary effort is normal.      Breath sounds: Normal breath sounds. Musculoskeletal:      Cervical back: Neck supple. Lymphadenopathy:      Cervical: No cervical adenopathy. Neurological:      Mental Status: He is alert. Cranial Nerves: Cranial nerves 2-12 are intact. Vitals:    11/15/22 0929   BP: 110/70   Site: Left Upper Arm   Position: Sitting   Cuff Size: Large Adult   Pulse: 52   SpO2: 95%   Weight: 176 lb 6.4 oz (80 kg)   Height: 5' 4\" (1.626 m)       General: alert, appears stated age, cooperative, and no distress   Body habitus: not obese       Visible deformity? no   Leg muscle asymmetry? no   Tender spinous processes? no   Tender back or buttock? no       Left Right   Pain with piriformis stretch no no   Strength Testing:     Quadriceps (L4) 4/5 5/5    Hamstrings (L5 and S1) 4/5 5/5    Ankle dorsiflexion (L4 and L5) 4/5 5/5    Ankle plantarflexion (S1) 4/5 5/5   Reflexes: Ankle jerk (S1): 2+  2+     Knee jerk (L4): 2+  2+     Babinski reflex: negative negative   Sensory Exam (Light Touch):     Medial foot (L4): yes yes    Mid-dorsal foot (L5): yes yes    Lateral foot (S1): yes yes   Straight Leg Raise Testing:      Degrees raised:  Normal Normal    Symptoms Evoked? no no            Lab Review   No visits with results within 6 Month(s) from this visit. Latest known visit with results is:   Office Visit on 12/28/2021   Component Date Value    PSA 12/28/2021 0.11            An electronic signature was used to authenticate this note.     --Matilda Najjar, MD

## 2022-12-16 DIAGNOSIS — E03.9 HYPOTHYROIDISM, UNSPECIFIED TYPE: ICD-10-CM

## 2022-12-16 RX ORDER — LEVOTHYROXINE SODIUM 0.05 MG/1
TABLET ORAL
Qty: 90 TABLET | Refills: 3 | Status: SHIPPED | OUTPATIENT
Start: 2022-12-16

## 2022-12-16 NOTE — TELEPHONE ENCOUNTER
Refill Request     CONFIRM preferrred pharmacy with the patient. If Mail Order Rx - Pend for 90 day refill. Last Seen: Last Seen Department: 11/15/2022  Last Seen by PCP: 11/15/2022    Last Written: 3/17/22    If no future appointment scheduled, route STAFF MESSAGE with patient name to the [facility] for scheduling. Next Appointment:   No future appointments. Message sent to [address] [place] [address] to schedule appt with patient?   NO      Requested Prescriptions     Pending Prescriptions Disp Refills    levothyroxine (SYNTHROID) 50 MCG tablet [Pharmacy Med Name: LEVOTHYROXINE 50 MCG TABLET] 90 tablet 2     Sig: TAKE 1 TABLET BY MOUTH EVERY DAY

## 2023-02-02 ENCOUNTER — HOSPITAL ENCOUNTER (OUTPATIENT)
Dept: VASCULAR LAB | Age: 73
Discharge: HOME | End: 2023-02-02
Payer: MEDICARE

## 2023-02-02 DIAGNOSIS — I65.21 STENOSIS OF RIGHT CAROTID ARTERY: ICD-10-CM

## 2023-02-02 PROCEDURE — 93880 EXTRACRANIAL BILAT STUDY: CPT

## 2023-02-09 ENCOUNTER — HOSPITAL ENCOUNTER (OUTPATIENT)
Dept: VASCULAR LAB | Age: 73
Discharge: HOME | End: 2023-02-09
Payer: MEDICARE

## 2023-02-09 DIAGNOSIS — I73.9 CLAUDICATION OF BOTH LOWER EXTREMITIES (HCC): ICD-10-CM

## 2023-02-09 DIAGNOSIS — R29.898 BILATERAL LEG WEAKNESS: ICD-10-CM

## 2023-02-09 DIAGNOSIS — R25.2 LEG CRAMPING: ICD-10-CM

## 2023-02-09 DIAGNOSIS — R09.89 POOR CIRCULATION OF EXTREMITY: ICD-10-CM

## 2023-02-09 PROCEDURE — 93925 LOWER EXTREMITY STUDY: CPT

## 2023-03-12 DIAGNOSIS — I10 ESSENTIAL HYPERTENSION: ICD-10-CM

## 2023-03-13 RX ORDER — CLOPIDOGREL BISULFATE 75 MG/1
TABLET ORAL
Qty: 90 TABLET | Refills: 2 | Status: SHIPPED | OUTPATIENT
Start: 2023-03-13

## 2023-03-13 RX ORDER — LISINOPRIL 5 MG/1
TABLET ORAL
Qty: 90 TABLET | Refills: 3 | Status: SHIPPED | OUTPATIENT
Start: 2023-03-13

## 2023-03-13 RX ORDER — SIMVASTATIN 20 MG
20 TABLET ORAL NIGHTLY
Qty: 90 TABLET | Refills: 3 | Status: SHIPPED | OUTPATIENT
Start: 2023-03-13

## 2023-03-13 NOTE — TELEPHONE ENCOUNTER
Last OV:2/9/2022  Future OV: 4/5/2023 NPLR  CMP: 11/15/2022  CBC: 11/15/2022  LIPID: 11/15/2022  LFT: 11/15/2022

## 2023-03-13 NOTE — TELEPHONE ENCOUNTER
Last OV:2/9/2022  CMP: 11/15/2022  CBC: 11/15/2022  LIPID: 11/15/2022  LFT: 11/15/2022    Front: appt need appt

## 2023-11-28 ENCOUNTER — NURSE ONLY (OUTPATIENT)
Dept: FAMILY MEDICINE CLINIC | Age: 73
End: 2023-11-28
Payer: MEDICARE

## 2023-11-28 DIAGNOSIS — Z23 NEED FOR INFLUENZA VACCINATION: Primary | ICD-10-CM

## 2023-11-28 PROCEDURE — G0008 ADMIN INFLUENZA VIRUS VAC: HCPCS | Performed by: FAMILY MEDICINE

## 2023-11-28 PROCEDURE — 90694 VACC AIIV4 NO PRSRV 0.5ML IM: CPT | Performed by: FAMILY MEDICINE

## 2023-12-08 DIAGNOSIS — E03.9 HYPOTHYROIDISM, UNSPECIFIED TYPE: ICD-10-CM

## 2023-12-08 RX ORDER — CLOPIDOGREL BISULFATE 75 MG/1
TABLET ORAL
Qty: 90 TABLET | Refills: 2 | Status: SHIPPED | OUTPATIENT
Start: 2023-12-08

## 2023-12-08 RX ORDER — LEVOTHYROXINE SODIUM 0.05 MG/1
TABLET ORAL
Qty: 90 TABLET | Refills: 0 | Status: SHIPPED | OUTPATIENT
Start: 2023-12-08

## 2023-12-08 NOTE — TELEPHONE ENCOUNTER
Refill Request     CONFIRM preferred pharmacy with the patient.    If Mail Order Rx - Pend for 90 day refill.      Last Seen: Last Seen Department: 11/15/2022  Last Seen by PCP: 11/15/2022    Last Written: 12/16/2022 90 tab 3 refills    If no future appointment scheduled:  Review the last OV with PCP and review information for follow-up visit,  Route STAFF MESSAGE with patient name to the  Pool for scheduling with the following information:            -  Timing of next visit           -  Visit type ie Physical, OV, etc           -  Diagnoses/Reason ie. COPD, HTN - Do not use MEDICATION, Follow-up or CHECK UP - Give reason for visit      Next Appointment:   Future Appointments   Date Time Provider Department Center   2/15/2024  1:30 PM Bertha Eastman MD [facility] [place] [facility]       Message sent to  to schedule appt with patient?  NO      Requested Prescriptions     Pending Prescriptions Disp Refills    levothyroxine (SYNTHROID) 50 MCG tablet [Pharmacy Med Name: LEVOTHYROXINE 50 MCG TABLET] 90 tablet 3     Sig: TAKE 1 TABLET BY MOUTH EVERY DAY

## 2024-03-04 DIAGNOSIS — I10 ESSENTIAL HYPERTENSION: ICD-10-CM

## 2024-03-04 DIAGNOSIS — E03.9 HYPOTHYROIDISM, UNSPECIFIED TYPE: ICD-10-CM

## 2024-03-04 DIAGNOSIS — Z79.899 MEDICATION MANAGEMENT: Primary | ICD-10-CM

## 2024-03-04 RX ORDER — SIMVASTATIN 20 MG
20 TABLET ORAL NIGHTLY
Qty: 30 TABLET | Refills: 0 | Status: SHIPPED | OUTPATIENT
Start: 2024-03-04

## 2024-03-04 RX ORDER — LISINOPRIL 5 MG/1
TABLET ORAL
Qty: 30 TABLET | Refills: 0 | Status: SHIPPED | OUTPATIENT
Start: 2024-03-04

## 2024-03-04 NOTE — TELEPHONE ENCOUNTER
Refill Request     CONFIRM preferred pharmacy with the patient.    If Mail Order Rx - Pend for 90 day refill.      Last Seen: Last Seen Department: 11/15/2022  Last Seen by PCP: 11/15/2022    Last Written: 12/08/2023, #90, 0 refills    If no future appointment scheduled:  Review the last OV with PCP and review information for follow-up visit,  Route STAFF MESSAGE with patient name to the  Pool for scheduling with the following information:            -  Timing of next visit           -  Visit type ie Physical, OV, etc           -  Diagnoses/Reason ie. COPD, HTN - Do not use MEDICATION, Follow-up or CHECK UP - Give reason for visit      Next Appointment:   Future Appointments   Date Time Provider Department [place]   3/11/2024  9:45 AM Tyron Jean-Baptiste APRN - CNP [facility]   5/16/2024 10:30 AM Bertha Eastman MD [facility] [place] [facility]       Message sent to  to schedule appt with patient?  YES      Requested Prescriptions     Pending Prescriptions Disp Refills    levothyroxine (SYNTHROID) 50 MCG tablet [Pharmacy Med Name: LEVOTHYROXINE 50 MCG TABLET] 90 tablet 0     Sig: TAKE 1 TABLET BY MOUTH EVERY DAY

## 2024-03-04 NOTE — TELEPHONE ENCOUNTER
Last ov- 02/09/2022 RKG  Upcoming ov- not scheduled and has cancelled in past  CBC, CMP/BMP, Lipid, TSH, LFT- 11/15/2022  Labs ordered, routing 30 day of medications and will call to schedule appt at appropriate time.

## 2024-03-05 RX ORDER — LEVOTHYROXINE SODIUM 0.05 MG/1
TABLET ORAL
Qty: 90 TABLET | Refills: 0 | Status: SHIPPED | OUTPATIENT
Start: 2024-03-05

## 2024-04-02 DIAGNOSIS — Z79.899 MEDICATION MANAGEMENT: ICD-10-CM

## 2024-04-02 DIAGNOSIS — I10 ESSENTIAL HYPERTENSION: ICD-10-CM

## 2024-04-02 RX ORDER — LISINOPRIL 5 MG/1
5 TABLET ORAL DAILY
Qty: 30 TABLET | Refills: 0 | Status: SHIPPED | OUTPATIENT
Start: 2024-04-02

## 2024-04-02 RX ORDER — SIMVASTATIN 20 MG
20 TABLET ORAL NIGHTLY
Qty: 30 TABLET | Refills: 0 | Status: SHIPPED | OUTPATIENT
Start: 2024-04-02

## 2024-04-02 NOTE — TELEPHONE ENCOUNTER
LOV  02/09/2022  UPCOMING  04/08/2024  CBC,BMP,LIPID,TSH  11/15/2022    Has cancelled in the past, but promises that he will be here on the 8th.  Labs are ordered in the chart.

## 2024-04-08 ENCOUNTER — OFFICE VISIT (OUTPATIENT)
Dept: CARDIOLOGY CLINIC | Age: 74
End: 2024-04-08
Payer: MEDICARE

## 2024-04-08 VITALS
BODY MASS INDEX: 28.86 KG/M2 | WEIGHT: 173.2 LBS | SYSTOLIC BLOOD PRESSURE: 132 MMHG | DIASTOLIC BLOOD PRESSURE: 62 MMHG | HEIGHT: 65 IN | OXYGEN SATURATION: 93 % | HEART RATE: 57 BPM

## 2024-04-08 DIAGNOSIS — I25.10 CORONARY ARTERY DISEASE INVOLVING NATIVE CORONARY ARTERY OF NATIVE HEART WITHOUT ANGINA PECTORIS: Primary | ICD-10-CM

## 2024-04-08 DIAGNOSIS — I10 PRIMARY HYPERTENSION: ICD-10-CM

## 2024-04-08 PROCEDURE — 3078F DIAST BP <80 MM HG: CPT | Performed by: NURSE PRACTITIONER

## 2024-04-08 PROCEDURE — 99214 OFFICE O/P EST MOD 30 MIN: CPT | Performed by: NURSE PRACTITIONER

## 2024-04-08 PROCEDURE — 3075F SYST BP GE 130 - 139MM HG: CPT | Performed by: NURSE PRACTITIONER

## 2024-04-08 PROCEDURE — 1123F ACP DISCUSS/DSCN MKR DOCD: CPT | Performed by: NURSE PRACTITIONER

## 2024-04-08 ASSESSMENT — ENCOUNTER SYMPTOMS
GASTROINTESTINAL NEGATIVE: 1
SHORTNESS OF BREATH: 1

## 2024-04-08 NOTE — PROGRESS NOTES
the last 72 hours.  FASTING LIPID PANEL:  Lab Results   Component Value Date/Time    HDL 48 11/15/2022 10:30 AM    HDL 47 12/16/2011 09:52 AM    LDLCALC 92 11/15/2022 10:30 AM    TRIG 46 11/15/2022 10:30 AM     LIVER PROFILE:No results for input(s): \"AST\", \"ALT\", \"ALB\" in the last 72 hours.  BNP:   Lab Results   Component Value Date/Time    PROBNP 44 07/05/2016 03:08 PM    PROBNP 58 03/24/2015 11:05 AM     Reviewed all labs and imaging today    Assessment:   Dyspnea with exertion: States chronic and at baseline  CAD: Stable, no angina; stress test 2021 no ischemia; patent stents on angiogram 2016; s/p BERNIE x2 LAD 2015  HTN: Controlled  HLD: Suboptimal, LDL 92, statin and recheck   -Appears intolerant to high intensity statins  Prediabetes    Plan:   1.  Update annual CMP, CBC, lipids with PCP  2.  Continue aspirin, Plavix, lisinopril, simvastatin  3.  Regular exercise and healthy diet encouraged  4.  Check BP at home and call the office if consistently out of goal range  5.  If any worsening dyspnea call the office and we will obtain echocardiogram  6.  Follow-up in 1 year with Dr. Mariluz Jean-Baptiste, APRN-CNP  [facility] [place]  [phone]

## 2024-04-08 NOTE — PATIENT INSTRUCTIONS
Everything looks great today, good job!  Continue current medications  Fasting blood work when you see PCP  Check BP at home and call the office if consistently out of goal range  Follow up in 1 year with Dr. Mcgraw

## 2024-05-09 DIAGNOSIS — E03.9 HYPOTHYROIDISM, UNSPECIFIED TYPE: ICD-10-CM

## 2024-05-09 RX ORDER — LEVOTHYROXINE SODIUM 0.05 MG/1
TABLET ORAL
Qty: 90 TABLET | Refills: 0 | OUTPATIENT
Start: 2024-05-09

## 2024-05-09 NOTE — TELEPHONE ENCOUNTER
Refill Request     CONFIRM preferred pharmacy with the patient.    If Mail Order Rx - Pend for 90 day refill.      Last Seen: Last Seen Department: 11/15/2022  Last Seen by PCP: 11/15/2022    Last Written: 3/5/24 90 with no refills     If no future appointment scheduled:  Review the last OV with PCP and review information for follow-up visit,  Route STAFF MESSAGE with patient name to the  Pool for scheduling with the following information:            -  Timing of next visit           -  Visit type ie Physical, OV, etc           -  Diagnoses/Reason ie. COPD, HTN - Do not use MEDICATION, Follow-up or CHECK UP - Give reason for visit      Next Appointment:   Future Appointments   Date Time Provider Department Center   5/23/2024  8:00 AM Andreas Coates APRN - CNP [place]  [place] [facility] [place]       Message sent to  to schedule appt with patient?  NO      Requested Prescriptions     Pending Prescriptions Disp Refills    levothyroxine (SYNTHROID) 50 MCG tablet [Pharmacy Med Name: LEVOTHYROXINE 50 MCG TABLET] 90 tablet 0     Sig: TAKE 1 TABLET BY MOUTH EVERY DAY

## 2024-05-09 NOTE — TELEPHONE ENCOUNTER
Please have patient get lab work completed that was ordered in March, so I know how to dose his levothyroxine. After he gets his TSH drawn, I can order his levothyroxine.

## 2024-05-10 NOTE — TELEPHONE ENCOUNTER
Pt informed that jenna will be giving him some to last til next appointment , and will be getting his blood work done prior to the appointment.   thanks

## 2024-05-10 NOTE — TELEPHONE ENCOUNTER
Patient is scheduled 5-23-24 with Andreas.  He is almost out of levothyroxine.  I spoke to Dr Eastman; she said we can fill until appointment.  He was unable to come sooner.

## 2024-05-13 NOTE — TELEPHONE ENCOUNTER
Pt advised, he stated that he would not have stopped in if he didn't have enough synthroid, pt stated that he is not going to argue about the situation. Pt stated that he is planning on getting the lab work completed tomorrow, he is asking for all the lab work that needs to be done for his physical/yearly blood work all at once. He stated that if the lab work can not be placed for him to have completed tomorrow he will not get the lab work done at all and will not be at his appt either. He stated that he is asking for PSA etc to all be ordered he dont care if it is 1 gallon or 2 that they need he wants this to be all ordered and completed for his lab draw tomorrow he plans to have done.

## 2024-05-14 DIAGNOSIS — Z79.899 MEDICATION MANAGEMENT: ICD-10-CM

## 2024-05-14 LAB
ALBUMIN SERPL-MCNC: 4.3 G/DL (ref 3.4–5)
ALBUMIN/GLOB SERPL: 1.7 {RATIO} (ref 1.1–2.2)
ALP SERPL-CCNC: 62 U/L (ref 40–129)
ALT SERPL-CCNC: 15 U/L (ref 10–40)
ANION GAP SERPL CALCULATED.3IONS-SCNC: 13 MMOL/L (ref 3–16)
AST SERPL-CCNC: 14 U/L (ref 15–37)
BILIRUB SERPL-MCNC: 0.8 MG/DL (ref 0–1)
BUN SERPL-MCNC: 12 MG/DL (ref 7–20)
CALCIUM SERPL-MCNC: 9.1 MG/DL (ref 8.3–10.6)
CHLORIDE SERPL-SCNC: 104 MMOL/L (ref 99–110)
CHOLEST SERPL-MCNC: 137 MG/DL (ref 0–199)
CO2 SERPL-SCNC: 25 MMOL/L (ref 21–32)
CREAT SERPL-MCNC: 0.6 MG/DL (ref 0.8–1.3)
DEPRECATED RDW RBC AUTO: 13.8 % (ref 12.4–15.4)
GFR SERPLBLD CREATININE-BSD FMLA CKD-EPI: >90 ML/MIN/{1.73_M2}
GLUCOSE SERPL-MCNC: 100 MG/DL (ref 70–99)
HCT VFR BLD AUTO: 42.5 % (ref 40.5–52.5)
HDLC SERPL-MCNC: 49 MG/DL (ref 40–60)
HGB BLD-MCNC: 14.7 G/DL (ref 13.5–17.5)
LDLC SERPL CALC-MCNC: 73 MG/DL
MCH RBC QN AUTO: 32.2 PG (ref 26–34)
MCHC RBC AUTO-ENTMCNC: 34.7 G/DL (ref 31–36)
MCV RBC AUTO: 92.7 FL (ref 80–100)
PLATELET # BLD AUTO: 173 K/UL (ref 135–450)
PMV BLD AUTO: 8.4 FL (ref 5–10.5)
POTASSIUM SERPL-SCNC: 4.4 MMOL/L (ref 3.5–5.1)
PROT SERPL-MCNC: 6.9 G/DL (ref 6.4–8.2)
RBC # BLD AUTO: 4.59 M/UL (ref 4.2–5.9)
SODIUM SERPL-SCNC: 142 MMOL/L (ref 136–145)
TRIGL SERPL-MCNC: 74 MG/DL (ref 0–150)
TSH SERPL DL<=0.005 MIU/L-ACNC: 3.02 UIU/ML (ref 0.27–4.2)
VLDLC SERPL CALC-MCNC: 15 MG/DL
WBC # BLD AUTO: 5.5 K/UL (ref 4–11)

## 2024-05-15 ENCOUNTER — TELEPHONE (OUTPATIENT)
Dept: CARDIOLOGY CLINIC | Age: 74
End: 2024-05-15

## 2024-05-15 NOTE — TELEPHONE ENCOUNTER
----- Message from KETTY Blood - CNP sent at 5/15/2024  3:10 PM EDT -----  Please let him know that kidney function, blood counts, thyroid, cholesterol all look goal.  No changes and follow-up as planned.  Thank you

## 2024-05-20 RX ORDER — LEVOTHYROXINE SODIUM 0.05 MG/1
50 TABLET ORAL DAILY
Qty: 90 TABLET | Refills: 0 | Status: SHIPPED | OUTPATIENT
Start: 2024-05-20 | End: 2024-05-24 | Stop reason: SDUPTHER

## 2024-05-24 ENCOUNTER — OFFICE VISIT (OUTPATIENT)
Dept: FAMILY MEDICINE CLINIC | Age: 74
End: 2024-05-24

## 2024-05-24 VITALS
DIASTOLIC BLOOD PRESSURE: 70 MMHG | WEIGHT: 179 LBS | TEMPERATURE: 98.4 F | HEART RATE: 57 BPM | BODY MASS INDEX: 30.56 KG/M2 | OXYGEN SATURATION: 98 % | SYSTOLIC BLOOD PRESSURE: 120 MMHG | HEIGHT: 64 IN

## 2024-05-24 DIAGNOSIS — Z12.11 ENCOUNTER FOR SCREENING COLONOSCOPY: ICD-10-CM

## 2024-05-24 DIAGNOSIS — Z12.5 PROSTATE CANCER SCREENING: ICD-10-CM

## 2024-05-24 DIAGNOSIS — I10 ESSENTIAL HYPERTENSION: ICD-10-CM

## 2024-05-24 DIAGNOSIS — E78.2 MIXED HYPERLIPIDEMIA: ICD-10-CM

## 2024-05-24 DIAGNOSIS — Z00.00 ROUTINE GENERAL MEDICAL EXAMINATION AT HEALTH CARE FACILITY: ICD-10-CM

## 2024-05-24 DIAGNOSIS — R73.03 PREDIABETES: ICD-10-CM

## 2024-05-24 DIAGNOSIS — Z00.00 ROUTINE GENERAL MEDICAL EXAMINATION AT HEALTH CARE FACILITY: Primary | ICD-10-CM

## 2024-05-24 DIAGNOSIS — E03.9 HYPOTHYROIDISM, UNSPECIFIED TYPE: ICD-10-CM

## 2024-05-24 DIAGNOSIS — I25.118 CORONARY ARTERY DISEASE OF NATIVE HEART WITH STABLE ANGINA PECTORIS, UNSPECIFIED VESSEL OR LESION TYPE (HCC): ICD-10-CM

## 2024-05-24 RX ORDER — SIMVASTATIN 20 MG
20 TABLET ORAL NIGHTLY
Qty: 90 TABLET | Refills: 2 | Status: SHIPPED | OUTPATIENT
Start: 2024-05-24 | End: 2025-02-18

## 2024-05-24 RX ORDER — LISINOPRIL 5 MG/1
5 TABLET ORAL DAILY
Qty: 90 TABLET | Refills: 2 | Status: SHIPPED | OUTPATIENT
Start: 2024-05-24 | End: 2025-02-18

## 2024-05-24 RX ORDER — LEVOTHYROXINE SODIUM 0.05 MG/1
50 TABLET ORAL DAILY
Qty: 90 TABLET | Refills: 2 | Status: SHIPPED | OUTPATIENT
Start: 2024-05-24 | End: 2025-02-18

## 2024-05-24 SDOH — ECONOMIC STABILITY: FOOD INSECURITY: WITHIN THE PAST 12 MONTHS, THE FOOD YOU BOUGHT JUST DIDN'T LAST AND YOU DIDN'T HAVE MONEY TO GET MORE.: NEVER TRUE

## 2024-05-24 SDOH — ECONOMIC STABILITY: HOUSING INSECURITY
IN THE LAST 12 MONTHS, WAS THERE A TIME WHEN YOU DID NOT HAVE A STEADY PLACE TO SLEEP OR SLEPT IN A SHELTER (INCLUDING NOW)?: NO

## 2024-05-24 SDOH — ECONOMIC STABILITY: FOOD INSECURITY: WITHIN THE PAST 12 MONTHS, YOU WORRIED THAT YOUR FOOD WOULD RUN OUT BEFORE YOU GOT MONEY TO BUY MORE.: NEVER TRUE

## 2024-05-24 SDOH — ECONOMIC STABILITY: INCOME INSECURITY: HOW HARD IS IT FOR YOU TO PAY FOR THE VERY BASICS LIKE FOOD, HOUSING, MEDICAL CARE, AND HEATING?: NOT HARD AT ALL

## 2024-05-24 ASSESSMENT — PATIENT HEALTH QUESTIONNAIRE - PHQ9
SUM OF ALL RESPONSES TO PHQ QUESTIONS 1-9: 0
2. FEELING DOWN, DEPRESSED OR HOPELESS: NOT AT ALL
SUM OF ALL RESPONSES TO PHQ9 QUESTIONS 1 & 2: 0
SUM OF ALL RESPONSES TO PHQ QUESTIONS 1-9: 0
1. LITTLE INTEREST OR PLEASURE IN DOING THINGS: NOT AT ALL

## 2024-05-24 ASSESSMENT — ENCOUNTER SYMPTOMS
GASTROINTESTINAL NEGATIVE: 1
CONSTIPATION: 0
TROUBLE SWALLOWING: 0
SORE THROAT: 0
VOMITING: 0
ABDOMINAL PAIN: 0
WHEEZING: 0
DIARRHEA: 0
ABDOMINAL DISTENTION: 0
CHEST TIGHTNESS: 0
SHORTNESS OF BREATH: 0
RESPIRATORY NEGATIVE: 1
COUGH: 0
NAUSEA: 0
COLOR CHANGE: 0
BLOOD IN STOOL: 0

## 2024-05-24 NOTE — PROGRESS NOTES
Well Adult Note  Name: Tristan Quiroz Today’s Date: 2024   MRN: [id number] Sex: Male   Age: 74 y.o. Ethnicity: Non- / Non    : 1950 Race: White (non-)      Tristan Quiroz is here for well adult exam.  History:  Pt is here today for yearly physical. Denies any chest pain, sob, heart palpitations, or syncope.      Review of Systems   Constitutional: Negative.  Negative for chills, diaphoresis and fever.   HENT: Negative.  Negative for ear pain, hearing loss, sore throat and trouble swallowing.    Eyes:  Negative for visual disturbance.   Respiratory: Negative.  Negative for cough, chest tightness, shortness of breath and wheezing.    Cardiovascular: Negative.  Negative for chest pain, palpitations and leg swelling.   Gastrointestinal: Negative.  Negative for abdominal distention, abdominal pain, blood in stool, constipation, diarrhea, nausea and vomiting.   Musculoskeletal: Negative.  Negative for arthralgias, gait problem and myalgias.   Skin: Negative.  Negative for color change, rash and wound.   Neurological: Negative.  Negative for dizziness, syncope, facial asymmetry, weakness, light-headedness, numbness and headaches.   Hematological: Negative.  Negative for adenopathy.       Allergies   Allergen Reactions    Statins Other (See Comments)     Muscle pain  Other reaction(s): Other (See Comments)  Muscle pain         Prior to Visit Medications    Medication Sig Taking? Authorizing Provider   levothyroxine (SYNTHROID) 50 MCG tablet Take 1 tablet by mouth daily Yes Bertha Eastman MD   simvastatin (ZOCOR) 20 MG tablet Take 1 tablet by mouth nightly Yes Rufus Mcgraw MD   lisinopril (PRINIVIL;ZESTRIL) 5 MG tablet Take 1 tablet by mouth daily Yes Rufus Mcgraw MD   clopidogrel (PLAVIX) 75 MG tablet TAKE 1 TABLET BY MOUTH EVERY DAY Yes Mirna Aiken, APRN - CNP   nitroGLYCERIN (NITROSTAT) 0.4 MG SL tablet Place 1 tablet under the tongue every 5 minutes as needed for

## 2024-05-25 LAB
EST. AVERAGE GLUCOSE BLD GHB EST-MCNC: 122.6 MG/DL
HBA1C MFR BLD: 5.9 %
PSA SERPL DL<=0.01 NG/ML-MCNC: 0.25 NG/ML (ref 0–4)

## 2024-09-05 RX ORDER — CLOPIDOGREL BISULFATE 75 MG/1
TABLET ORAL
Qty: 90 TABLET | Refills: 2 | Status: SHIPPED | OUTPATIENT
Start: 2024-09-05

## 2024-09-26 ENCOUNTER — TELEPHONE (OUTPATIENT)
Dept: FAMILY MEDICINE CLINIC | Age: 74
End: 2024-09-26

## 2024-09-26 NOTE — TELEPHONE ENCOUNTER
Left message for pt to schedule nurse AWV- This can be done  in office or via telephone-( please schedule as a [facility] [place] type if telephone.)     Oxybutynin Counseling:  I discussed with the patient the risks of oxybutynin including but not limited to skin rash, drowsiness, dry mouth, difficulty urinating, and blurred vision.

## 2024-10-10 ENCOUNTER — TELEMEDICINE (OUTPATIENT)
Dept: FAMILY MEDICINE CLINIC | Age: 74
End: 2024-10-10

## 2024-10-10 DIAGNOSIS — Z00.00 MEDICARE ANNUAL WELLNESS VISIT, SUBSEQUENT: Primary | ICD-10-CM

## 2024-10-10 ASSESSMENT — LIFESTYLE VARIABLES
HAS A RELATIVE, FRIEND, DOCTOR, OR ANOTHER HEALTH PROFESSIONAL EXPRESSED CONCERN ABOUT YOUR DRINKING OR SUGGESTED YOU CUT DOWN: NO
HOW OFTEN DO YOU HAVE A DRINK CONTAINING ALCOHOL: 2-3 TIMES A WEEK
HOW OFTEN DURING THE LAST YEAR HAVE YOU HAD A FEELING OF GUILT OR REMORSE AFTER DRINKING: NEVER
HOW OFTEN DURING THE LAST YEAR HAVE YOU BEEN UNABLE TO REMEMBER WHAT HAPPENED THE NIGHT BEFORE BECAUSE YOU HAD BEEN DRINKING: NEVER
HOW OFTEN DURING THE LAST YEAR HAVE YOU FOUND THAT YOU WERE NOT ABLE TO STOP DRINKING ONCE YOU HAD STARTED: NEVER
HAVE YOU OR SOMEONE ELSE BEEN INJURED AS A RESULT OF YOUR DRINKING: NO
HOW OFTEN DURING THE LAST YEAR HAVE YOU NEEDED AN ALCOHOLIC DRINK FIRST THING IN THE MORNING TO GET YOURSELF GOING AFTER A NIGHT OF HEAVY DRINKING: NEVER
HOW OFTEN DURING THE LAST YEAR HAVE YOU FAILED TO DO WHAT WAS NORMALLY EXPECTED FROM YOU BECAUSE OF DRINKING: NEVER
HOW MANY STANDARD DRINKS CONTAINING ALCOHOL DO YOU HAVE ON A TYPICAL DAY: 1 OR 2

## 2024-10-10 ASSESSMENT — PATIENT HEALTH QUESTIONNAIRE - PHQ9
SUM OF ALL RESPONSES TO PHQ QUESTIONS 1-9: 0
2. FEELING DOWN, DEPRESSED OR HOPELESS: NOT AT ALL
SUM OF ALL RESPONSES TO PHQ QUESTIONS 1-9: 0
SUM OF ALL RESPONSES TO PHQ QUESTIONS 1-9: 0
SUM OF ALL RESPONSES TO PHQ9 QUESTIONS 1 & 2: 0
SUM OF ALL RESPONSES TO PHQ QUESTIONS 1-9: 0
1. LITTLE INTEREST OR PLEASURE IN DOING THINGS: NOT AT ALL

## 2024-10-10 NOTE — PROGRESS NOTES
Medicare Annual Wellness Visit    Tristan Quiroz is here for Medicare AWV    Assessment & Plan   Medicare annual wellness visit, subsequent  Recommendations for Preventive Services Due: see orders and patient instructions/AVS.  Recommended screening schedule for the next 5-10 years is provided to the patient in written form: see Patient Instructions/AVS.     No follow-ups on file.     Subjective       Patient's complete Health Risk Assessment and screening values have been reviewed and are found in Flowsheets. The following problems were reviewed today and where indicated follow up appointments were made and/or referrals ordered.    Positive Risk Factor Screenings with Interventions:    Fall Risk:  Do you feel unsteady or are you worried about falling? : no  2 or more falls in past year?: (!) yes  Fall with injury in past year?: no                  Abnormal BMI (obese):  There is no height or weight on file to calculate BMI. (!) Abnormal  Interventions:  See AVS for additional education material        Dentist Screen:  Have you seen the dentist within the past year?: (!) No   Vision Screen:  Do you have difficulty driving, watching TV, or doing any of your daily activities because of your eyesight?: No  Have you had an eye exam within the past year?: (!) No                    Objective    Patient-Reported Vitals  Patient-Reported Weight: 175lb  Patient-Reported Height: 5' 4\"                 Allergies   Allergen Reactions    Statins Other (See Comments)     Muscle pain  Other reaction(s): Other (See Comments)  Muscle pain     Prior to Visit Medications    Medication Sig Taking? Authorizing Provider   clopidogrel (PLAVIX) 75 MG tablet TAKE 1 TABLET BY MOUTH EVERY DAY  Tyron Jean-Baptiste APRN - CNP   simvastatin (ZOCOR) 20 MG tablet Take 1 tablet by mouth nightly  Andreas Coates APRN - CNP   lisinopril (PRINIVIL;ZESTRIL) 5 MG tablet Take 1 tablet by mouth daily  Andreas Coates APRN - ALBIN   levothyroxine

## 2025-01-02 ENCOUNTER — NURSE ONLY (OUTPATIENT)
Dept: FAMILY MEDICINE CLINIC | Age: 75
End: 2025-01-02
Payer: MEDICARE

## 2025-01-02 PROCEDURE — G0008 ADMIN INFLUENZA VIRUS VAC: HCPCS | Performed by: FAMILY MEDICINE

## 2025-01-02 PROCEDURE — 90653 IIV ADJUVANT VACCINE IM: CPT | Performed by: FAMILY MEDICINE

## 2025-01-10 DIAGNOSIS — I10 ESSENTIAL HYPERTENSION: ICD-10-CM

## 2025-01-10 DIAGNOSIS — E78.2 MIXED HYPERLIPIDEMIA: ICD-10-CM

## 2025-01-10 NOTE — TELEPHONE ENCOUNTER
Refill Request     CONFIRM preferred pharmacy with the patient.    If Mail Order Rx - Pend for 90 day refill.      Last Seen: Last Seen Department: 10/10/2024  Last Seen by PCP: 5/24/2024    Last Written: 5/2/24    If no future appointment scheduled:  Review the last OV with PCP and review information for follow-up visit,  Route STAFF MESSAGE with patient name to the  Pool for scheduling with the following information:            -  Timing of next visit           -  Visit type ie Physical, OV, etc           -  Diagnoses/Reason ie. COPD, HTN - Do not use MEDICATION, Follow-up or CHECK UP - Give reason for visit      Next Appointment:   Future Appointments   Date Time Provider Department Center   1/16/2025  1:00 PM Andreas Coates APRN - CNP [facility] DEP   1/17/2025  9:15 AM Rufus Mcgraw MD [facility]   5/29/2025  8:00 AM Bertha Eastman MD [facility] DEP       Message sent to  to schedule appt with patient?  NO      Requested Prescriptions     Pending Prescriptions Disp Refills    simvastatin (ZOCOR) 20 MG tablet [Pharmacy Med Name: SIMVASTATIN 20 MG TABLET] 90 tablet 2     Sig: TAKE 1 TABLET BY MOUTH EVERY DAY AT NIGHT    lisinopril (PRINIVIL;ZESTRIL) 5 MG tablet [Pharmacy Med Name: LISINOPRIL 5 MG TABLET] 90 tablet 2     Sig: TAKE 1 TABLET BY MOUTH EVERY DAY

## 2025-01-11 RX ORDER — LISINOPRIL 5 MG/1
5 TABLET ORAL DAILY
Qty: 90 TABLET | Refills: 2 | Status: SHIPPED | OUTPATIENT
Start: 2025-01-11

## 2025-01-11 RX ORDER — SIMVASTATIN 20 MG
20 TABLET ORAL NIGHTLY
Qty: 90 TABLET | Refills: 2 | Status: SHIPPED | OUTPATIENT
Start: 2025-01-11

## 2025-01-13 ENCOUNTER — TRANSCRIBE ORDERS (OUTPATIENT)
Dept: ADMINISTRATIVE | Age: 75
End: 2025-01-13

## 2025-01-13 DIAGNOSIS — M19.011 PRIMARY OSTEOARTHRITIS OF RIGHT SHOULDER: Primary | ICD-10-CM

## 2025-01-15 ENCOUNTER — TELEPHONE (OUTPATIENT)
Dept: FAMILY MEDICINE CLINIC | Age: 75
End: 2025-01-15

## 2025-01-15 NOTE — TELEPHONE ENCOUNTER
----- Message from Andreas Coates, KETTY - CNP sent at 1/15/2025  1:01 PM EST -----  This pt is scheduled for an appointment tomorrow at 1:00. Can you please reschedule this because we have a provider meeting from 12-1:30 tomorrow 1/16?     Thanks,  Andreas

## 2025-01-17 ENCOUNTER — OFFICE VISIT (OUTPATIENT)
Dept: FAMILY MEDICINE CLINIC | Age: 75
End: 2025-01-17
Payer: MEDICARE

## 2025-01-17 ENCOUNTER — HOSPITAL ENCOUNTER (OUTPATIENT)
Age: 75
Discharge: HOME | End: 2025-01-17
Payer: MEDICARE

## 2025-01-17 ENCOUNTER — OFFICE VISIT (OUTPATIENT)
Dept: CARDIOLOGY CLINIC | Age: 75
End: 2025-01-17
Payer: MEDICARE

## 2025-01-17 VITALS
WEIGHT: 171.4 LBS | TEMPERATURE: 97.3 F | OXYGEN SATURATION: 98 % | BODY MASS INDEX: 31.54 KG/M2 | HEART RATE: 66 BPM | DIASTOLIC BLOOD PRESSURE: 72 MMHG | SYSTOLIC BLOOD PRESSURE: 120 MMHG | RESPIRATION RATE: 16 BRPM | HEIGHT: 62 IN

## 2025-01-17 VITALS
HEIGHT: 62 IN | HEART RATE: 59 BPM | DIASTOLIC BLOOD PRESSURE: 76 MMHG | OXYGEN SATURATION: 96 % | SYSTOLIC BLOOD PRESSURE: 120 MMHG | WEIGHT: 170 LBS | BODY MASS INDEX: 31.28 KG/M2

## 2025-01-17 DIAGNOSIS — Z01.818 PRE-OP EXAM: Primary | ICD-10-CM

## 2025-01-17 DIAGNOSIS — I25.10 CORONARY ARTERY DISEASE INVOLVING NATIVE CORONARY ARTERY OF NATIVE HEART WITHOUT ANGINA PECTORIS: ICD-10-CM

## 2025-01-17 DIAGNOSIS — E03.9 HYPOTHYROIDISM, UNSPECIFIED TYPE: ICD-10-CM

## 2025-01-17 DIAGNOSIS — I10 ESSENTIAL HYPERTENSION: ICD-10-CM

## 2025-01-17 DIAGNOSIS — E78.2 MIXED HYPERLIPIDEMIA: ICD-10-CM

## 2025-01-17 DIAGNOSIS — I10 PRIMARY HYPERTENSION: ICD-10-CM

## 2025-01-17 DIAGNOSIS — R42 DIZZINESS: ICD-10-CM

## 2025-01-17 DIAGNOSIS — Z01.810 PREOP CARDIOVASCULAR EXAM: Primary | ICD-10-CM

## 2025-01-17 PROBLEM — H83.3X3 NOISE-INDUCED HEARING LOSS OF BOTH EARS: Status: ACTIVE | Noted: 2017-10-11

## 2025-01-17 PROBLEM — H93.13 TINNITUS, BILATERAL: Status: ACTIVE | Noted: 2017-10-10

## 2025-01-17 PROBLEM — H69.93 ETD (EUSTACHIAN TUBE DYSFUNCTION), BILATERAL: Status: ACTIVE | Noted: 2017-10-10

## 2025-01-17 PROBLEM — H93.293 AUDITORY DISCRIMINATION IMPAIRMENT, BILATERAL: Status: ACTIVE | Noted: 2017-10-10

## 2025-01-17 LAB
ALBUMIN SERPL-MCNC: 4.2 G/DL (ref 3.4–5)
ANION GAP SERPL CALCULATED.3IONS-SCNC: 9 MMOL/L (ref 3–16)
APTT BLD: 30.3 SEC (ref 22.1–36.4)
BASOPHILS # BLD: 0 K/UL (ref 0–0.2)
BASOPHILS NFR BLD: 0.7 %
BUN SERPL-MCNC: 12 MG/DL (ref 7–20)
CALCIUM SERPL-MCNC: 9.1 MG/DL (ref 8.3–10.6)
CHLORIDE SERPL-SCNC: 101 MMOL/L (ref 99–110)
CO2 SERPL-SCNC: 27 MMOL/L (ref 21–32)
CREAT SERPL-MCNC: 0.7 MG/DL (ref 0.8–1.3)
DEPRECATED RDW RBC AUTO: 13.2 % (ref 12.4–15.4)
EOSINOPHIL # BLD: 0.1 K/UL (ref 0–0.6)
EOSINOPHIL NFR BLD: 1.6 %
EST. AVERAGE GLUCOSE BLD GHB EST-MCNC: 122.6 MG/DL
GFR SERPLBLD CREATININE-BSD FMLA CKD-EPI: >90 ML/MIN/{1.73_M2}
GLUCOSE SERPL-MCNC: 105 MG/DL (ref 70–99)
HBA1C MFR BLD: 5.9 %
HCT VFR BLD AUTO: 44.3 % (ref 40.5–52.5)
HGB BLD-MCNC: 14.8 G/DL (ref 13.5–17.5)
INR PPP: 1.05 (ref 0.85–1.15)
LYMPHOCYTES # BLD: 1.1 K/UL (ref 1–5.1)
LYMPHOCYTES NFR BLD: 18.6 %
MCH RBC QN AUTO: 31.6 PG (ref 26–34)
MCHC RBC AUTO-ENTMCNC: 33.3 G/DL (ref 31–36)
MCV RBC AUTO: 94.7 FL (ref 80–100)
MONOCYTES # BLD: 0.4 K/UL (ref 0–1.3)
MONOCYTES NFR BLD: 7.2 %
NEUTROPHILS # BLD: 4.4 K/UL (ref 1.7–7.7)
NEUTROPHILS NFR BLD: 71.9 %
PLATELET # BLD AUTO: 198 K/UL (ref 135–450)
PMV BLD AUTO: 8.2 FL (ref 5–10.5)
POTASSIUM SERPL-SCNC: 5.2 MMOL/L (ref 3.5–5.1)
PROTHROMBIN TIME: 13.9 SEC (ref 11.9–14.9)
RBC # BLD AUTO: 4.68 M/UL (ref 4.2–5.9)
SODIUM SERPL-SCNC: 137 MMOL/L (ref 136–145)
TRANSFERRIN SERPL-MCNC: 242 MG/DL (ref 200–360)
WBC # BLD AUTO: 6.1 K/UL (ref 4–11)

## 2025-01-17 PROCEDURE — 99214 OFFICE O/P EST MOD 30 MIN: CPT | Performed by: NURSE PRACTITIONER

## 2025-01-17 PROCEDURE — 1159F MED LIST DOCD IN RCRD: CPT | Performed by: INTERNAL MEDICINE

## 2025-01-17 PROCEDURE — 3074F SYST BP LT 130 MM HG: CPT | Performed by: INTERNAL MEDICINE

## 2025-01-17 PROCEDURE — 1123F ACP DISCUSS/DSCN MKR DOCD: CPT | Performed by: NURSE PRACTITIONER

## 2025-01-17 PROCEDURE — 3078F DIAST BP <80 MM HG: CPT | Performed by: INTERNAL MEDICINE

## 2025-01-17 PROCEDURE — 80048 BASIC METABOLIC PNL TOTAL CA: CPT

## 2025-01-17 PROCEDURE — 1123F ACP DISCUSS/DSCN MKR DOCD: CPT | Performed by: INTERNAL MEDICINE

## 2025-01-17 PROCEDURE — 36415 COLL VENOUS BLD VENIPUNCTURE: CPT

## 2025-01-17 PROCEDURE — 3078F DIAST BP <80 MM HG: CPT | Performed by: NURSE PRACTITIONER

## 2025-01-17 PROCEDURE — 1160F RVW MEDS BY RX/DR IN RCRD: CPT | Performed by: INTERNAL MEDICINE

## 2025-01-17 PROCEDURE — 85730 THROMBOPLASTIN TIME PARTIAL: CPT

## 2025-01-17 PROCEDURE — 99214 OFFICE O/P EST MOD 30 MIN: CPT | Performed by: INTERNAL MEDICINE

## 2025-01-17 PROCEDURE — 1159F MED LIST DOCD IN RCRD: CPT | Performed by: NURSE PRACTITIONER

## 2025-01-17 PROCEDURE — 85025 COMPLETE CBC W/AUTO DIFF WBC: CPT

## 2025-01-17 PROCEDURE — 82040 ASSAY OF SERUM ALBUMIN: CPT

## 2025-01-17 PROCEDURE — 84466 ASSAY OF TRANSFERRIN: CPT

## 2025-01-17 PROCEDURE — 93000 ELECTROCARDIOGRAM COMPLETE: CPT | Performed by: INTERNAL MEDICINE

## 2025-01-17 PROCEDURE — 83036 HEMOGLOBIN GLYCOSYLATED A1C: CPT

## 2025-01-17 PROCEDURE — 3074F SYST BP LT 130 MM HG: CPT | Performed by: NURSE PRACTITIONER

## 2025-01-17 PROCEDURE — 85610 PROTHROMBIN TIME: CPT

## 2025-01-17 RX ORDER — CHLORHEXIDINE GLUCONATE 40 MG/ML
SOLUTION TOPICAL
COMMUNITY
Start: 2025-01-10

## 2025-01-17 RX ORDER — MUPIROCIN 20 MG/G
OINTMENT TOPICAL
COMMUNITY
Start: 2025-01-10

## 2025-01-17 SDOH — ECONOMIC STABILITY: FOOD INSECURITY: WITHIN THE PAST 12 MONTHS, YOU WORRIED THAT YOUR FOOD WOULD RUN OUT BEFORE YOU GOT MONEY TO BUY MORE.: NEVER TRUE

## 2025-01-17 SDOH — ECONOMIC STABILITY: FOOD INSECURITY: WITHIN THE PAST 12 MONTHS, THE FOOD YOU BOUGHT JUST DIDN'T LAST AND YOU DIDN'T HAVE MONEY TO GET MORE.: NEVER TRUE

## 2025-01-17 ASSESSMENT — ANXIETY QUESTIONNAIRES
6. BECOMING EASILY ANNOYED OR IRRITABLE: NOT AT ALL
IF YOU CHECKED OFF ANY PROBLEMS ON THIS QUESTIONNAIRE, HOW DIFFICULT HAVE THESE PROBLEMS MADE IT FOR YOU TO DO YOUR WORK, TAKE CARE OF THINGS AT HOME, OR GET ALONG WITH OTHER PEOPLE: NOT DIFFICULT AT ALL
GAD7 TOTAL SCORE: 0
7. FEELING AFRAID AS IF SOMETHING AWFUL MIGHT HAPPEN: NOT AT ALL
3. WORRYING TOO MUCH ABOUT DIFFERENT THINGS: NOT AT ALL
1. FEELING NERVOUS, ANXIOUS, OR ON EDGE: NOT AT ALL
2. NOT BEING ABLE TO STOP OR CONTROL WORRYING: NOT AT ALL
4. TROUBLE RELAXING: NOT AT ALL
5. BEING SO RESTLESS THAT IT IS HARD TO SIT STILL: NOT AT ALL

## 2025-01-17 ASSESSMENT — PATIENT HEALTH QUESTIONNAIRE - PHQ9
SUM OF ALL RESPONSES TO PHQ QUESTIONS 1-9: 0
3. TROUBLE FALLING OR STAYING ASLEEP: NOT AT ALL
4. FEELING TIRED OR HAVING LITTLE ENERGY: NOT AT ALL
9. THOUGHTS THAT YOU WOULD BE BETTER OFF DEAD, OR OF HURTING YOURSELF: NOT AT ALL
SUM OF ALL RESPONSES TO PHQ9 QUESTIONS 1 & 2: 0
5. POOR APPETITE OR OVEREATING: NOT AT ALL
8. MOVING OR SPEAKING SO SLOWLY THAT OTHER PEOPLE COULD HAVE NOTICED. OR THE OPPOSITE, BEING SO FIGETY OR RESTLESS THAT YOU HAVE BEEN MOVING AROUND A LOT MORE THAN USUAL: NOT AT ALL
7. TROUBLE CONCENTRATING ON THINGS, SUCH AS READING THE NEWSPAPER OR WATCHING TELEVISION: NOT AT ALL
SUM OF ALL RESPONSES TO PHQ QUESTIONS 1-9: 0
2. FEELING DOWN, DEPRESSED OR HOPELESS: NOT AT ALL
6. FEELING BAD ABOUT YOURSELF - OR THAT YOU ARE A FAILURE OR HAVE LET YOURSELF OR YOUR FAMILY DOWN: NOT AT ALL
1. LITTLE INTEREST OR PLEASURE IN DOING THINGS: NOT AT ALL
SUM OF ALL RESPONSES TO PHQ QUESTIONS 1-9: 0
SUM OF ALL RESPONSES TO PHQ QUESTIONS 1-9: 0
10. IF YOU CHECKED OFF ANY PROBLEMS, HOW DIFFICULT HAVE THESE PROBLEMS MADE IT FOR YOU TO DO YOUR WORK, TAKE CARE OF THINGS AT HOME, OR GET ALONG WITH OTHER PEOPLE: NOT DIFFICULT AT ALL

## 2025-01-17 NOTE — PATIENT INSTRUCTIONS
Plan:  Labs reviewed in epic and discussed with patient.  Current medications reviewed.  Refills given as warranted.  Patient is intermediate risk clinically for non cardiac type of surgery. May proceed as planned.   I will send a copy of your clearance to LETITIA Anguiano at [facility]  No cardiac testing at this time.    Follow up with me in 1 year.

## 2025-01-17 NOTE — PROGRESS NOTES
year.     This note is scribed in the presence of Dr. Rufus Mcgraw MD by Cristina Baker, ELIZABETH.  I, Dr. Rufus Mcgraw, personally performed the services described in this documentation, as scribed by the above signed scribe in my presence. It is both accurate and complete to my knowledge. I agree with the details independently gathered by the clinical support staff, while the remaining scribed note accurately describes my personal service to the patient.      QUALITY MEASURES  1. Tobacco Cessation Counseling: yes  2. Retake of BP if >140/90:   NA  3. Documentation to PCP/referring for new patient:  Sent to PCP at close of office visit  4. CAD patient on anti-platelet: DAPT  5. CAD patient on STATIN therapy:  On  zocor 20mg   6. Patient with CHF and aFib on anticoagulation:  NA       Rufus Mcgraw MD  [facility]  [phone] [place] office  [phone] [facility]

## 2025-01-17 NOTE — PROGRESS NOTES
Subjective:   Tristan Quiroz is a 74 y.o. male with a history of CAD, HTN, HLD, thyroid disease.  He had BERNIE x 2 LAD in 3/2015.    Tristan Quiroz is a 75 y.o. male who presents to the office today for a preoperative consultation at the request of surgeon Dr. Anguiano who plans on performing right shoulder replacement on January 30, 2025.   Planned anesthesia is General.  The patient has the following known anesthesia issues:  none   Patient has a bleeding risk of : no recent abnormal bleeding  Patient does not have objection to receiving blood products if needed.  Patient's medications, allergies, past medical, surgical, social and family histories were reviewed and updated as appropriate.    Past Medical History:   Diagnosis Date    Amnesia 9/1/2012    Arthritis 3/31/2011    Benign positional vertigo 3/31/2011    CAD (coronary artery disease)     Carotid artery stenosis 8/18/2014    Heart murmur 1970    HTN (hypertension) 3/31/2011    Hyperlipidemia     Hypothyroid 3/2011    Neck pain 3/31/2011    Tubular adenoma of colon       Past Surgical History:   Procedure Laterality Date    COLONOSCOPY      COLONOSCOPY  11/17/2020    COLONOSCOPY SUBMUCOSAL/BOTOX INJECTION to control hemorrhage Dr Freire 11/17/20    COLONOSCOPY N/A 11/17/2020    COLON W/ANES. (13:00) performed by Mina Freire MD at [facility] ENDOSCOPY    COLONOSCOPY N/A 11/17/2020    COLONOSCOPY CONTROL HEMORRHAGE performed by Mina Freire MD at [facility] ENDOSCOPY    COLONOSCOPY  11/17/2020    COLONOSCOPY SUBMUCOSAL/BOTOX INJECTION performed by Mina Freire MD at [facility] ENDOSCOPY    CORONARY ANGIOPLASTY WITH STENT PLACEMENT  03/25/2015    DESx1 mLAD [facility] 3x8    DIAGNOSTIC CARDIAC CATH LAB PROCEDURE      ENDOSCOPY, COLON, DIAGNOSTIC      EYE SURGERY      calcium deposits removed    HEMORRHOID SURGERY  1988    HERNIA REPAIR      KNEE ARTHROSCOPY Left 4/13/2021    VIDEO ARTHROSCOPY LEFT KNEE, PARTIAL MEDIAL MENISCECTOMY WITH MENISCAL REPAIR

## 2025-01-21 ENCOUNTER — HOSPITAL ENCOUNTER (OUTPATIENT)
Dept: CT IMAGING | Age: 75
Discharge: HOME | End: 2025-01-21
Attending: ORTHOPAEDIC SURGERY
Payer: MEDICARE

## 2025-01-21 DIAGNOSIS — M19.011 PRIMARY OSTEOARTHRITIS OF RIGHT SHOULDER: ICD-10-CM

## 2025-01-21 PROCEDURE — 73200 CT UPPER EXTREMITY W/O DYE: CPT

## 2025-01-24 ENCOUNTER — TELEPHONE (OUTPATIENT)
Dept: FAMILY MEDICINE CLINIC | Age: 75
End: 2025-01-24

## 2025-01-24 NOTE — TELEPHONE ENCOUNTER
Nita from [facility] calling asking if Pre Op note can be updated for patient to have up coming surgery.     Please advise  Thank you     Nita - [phone]

## 2025-02-16 PROBLEM — Z01.810 PREOP CARDIOVASCULAR EXAM: Status: RESOLVED | Noted: 2025-01-17 | Resolved: 2025-02-16

## 2025-03-23 ENCOUNTER — TELEPHONE (OUTPATIENT)
Dept: FAMILY MEDICINE CLINIC | Age: 75
End: 2025-03-23

## 2025-05-01 ENCOUNTER — OFFICE VISIT (OUTPATIENT)
Dept: FAMILY MEDICINE CLINIC | Age: 75
End: 2025-05-01
Payer: MEDICARE

## 2025-05-01 VITALS
OXYGEN SATURATION: 96 % | SYSTOLIC BLOOD PRESSURE: 118 MMHG | TEMPERATURE: 97.9 F | BODY MASS INDEX: 30.69 KG/M2 | DIASTOLIC BLOOD PRESSURE: 72 MMHG | WEIGHT: 166.8 LBS | HEIGHT: 62 IN | HEART RATE: 86 BPM

## 2025-05-01 DIAGNOSIS — J18.9 ACUTE PNEUMONIA: Primary | ICD-10-CM

## 2025-05-01 PROCEDURE — 1123F ACP DISCUSS/DSCN MKR DOCD: CPT

## 2025-05-01 PROCEDURE — 1159F MED LIST DOCD IN RCRD: CPT

## 2025-05-01 PROCEDURE — 3078F DIAST BP <80 MM HG: CPT

## 2025-05-01 PROCEDURE — 3074F SYST BP LT 130 MM HG: CPT

## 2025-05-01 PROCEDURE — 1160F RVW MEDS BY RX/DR IN RCRD: CPT

## 2025-05-01 PROCEDURE — 99213 OFFICE O/P EST LOW 20 MIN: CPT

## 2025-05-01 RX ORDER — DEXTROMETHORPHAN HYDROBROMIDE AND PROMETHAZINE HYDROCHLORIDE 15; 6.25 MG/5ML; MG/5ML
5 SYRUP ORAL NIGHTLY
Qty: 118 ML | Refills: 0 | Status: SHIPPED | OUTPATIENT
Start: 2025-05-01 | End: 2025-05-08

## 2025-05-01 RX ORDER — AZITHROMYCIN 250 MG/1
TABLET, FILM COATED ORAL
Qty: 6 TABLET | Refills: 0 | Status: SHIPPED | OUTPATIENT
Start: 2025-05-01 | End: 2025-05-11

## 2025-05-01 SDOH — ECONOMIC STABILITY: FOOD INSECURITY: WITHIN THE PAST 12 MONTHS, THE FOOD YOU BOUGHT JUST DIDN'T LAST AND YOU DIDN'T HAVE MONEY TO GET MORE.: NEVER TRUE

## 2025-05-01 SDOH — ECONOMIC STABILITY: FOOD INSECURITY: WITHIN THE PAST 12 MONTHS, YOU WORRIED THAT YOUR FOOD WOULD RUN OUT BEFORE YOU GOT MONEY TO BUY MORE.: NEVER TRUE

## 2025-05-01 ASSESSMENT — ENCOUNTER SYMPTOMS
NAUSEA: 0
CONSTIPATION: 0
TROUBLE SWALLOWING: 0
ABDOMINAL PAIN: 0
SHORTNESS OF BREATH: 1
DIARRHEA: 0
COUGH: 1
SORE THROAT: 1
CHEST TIGHTNESS: 0
RHINORRHEA: 1
SINUS PRESSURE: 0
WHEEZING: 1

## 2025-05-01 ASSESSMENT — PATIENT HEALTH QUESTIONNAIRE - PHQ9
1. LITTLE INTEREST OR PLEASURE IN DOING THINGS: NOT AT ALL
SUM OF ALL RESPONSES TO PHQ QUESTIONS 1-9: 0
2. FEELING DOWN, DEPRESSED OR HOPELESS: NOT AT ALL

## 2025-05-01 NOTE — PROGRESS NOTES
Tristan Quiroz 75 y.o. male    Chief Complaint   Patient presents with    Other     Worsening cough ongoing for about 4 weeks  Unable to sleep  Yellow phlegm   Has trialed robitussin and elderberry        HPI    A few weeks of cough and chest congestion. He is coughing up yellow phlegm. Feeling SoB. Getting more fatigued than normal. No known fevers. He is also having a sore throat with drainage. Has tried mucinex, robitussin, and elderberry without improvement. Denies chest pain or pain with deep breaths.     Current Outpatient Medications:     simvastatin (ZOCOR) 20 MG tablet, TAKE 1 TABLET BY MOUTH EVERY DAY AT NIGHT, Disp: 90 tablet, Rfl: 2    lisinopril (PRINIVIL;ZESTRIL) 5 MG tablet, TAKE 1 TABLET BY MOUTH EVERY DAY, Disp: 90 tablet, Rfl: 2    clopidogrel (PLAVIX) 75 MG tablet, TAKE 1 TABLET BY MOUTH EVERY DAY, Disp: 90 tablet, Rfl: 2    nitroGLYCERIN (NITROSTAT) 0.4 MG SL tablet, Place 1 tablet under the tongue every 5 minutes as needed for Chest pain up to max of 3 total doses. If no relief after 1 dose, call 911., Disp: 25 tablet, Rfl: 1    Omega-3 Fatty Acids (OMEGA-3 FISH OIL) 1000 MG CAPS, Take by mouth daily , Disp: , Rfl:     aspirin 81 MG tablet, Take 1 tablet by mouth daily, Disp: , Rfl:     vitamin D (CHOLECALCIFEROL) 1000 UNIT TABS tablet, Take 1 tablet by mouth daily, Disp: , Rfl:     levothyroxine (SYNTHROID) 50 MCG tablet, Take 1 tablet by mouth daily, Disp: 90 tablet, Rfl: 2      Vitals:    05/01/25 0809   BP: 118/72   BP Site: Right Upper Arm   Patient Position: Sitting   BP Cuff Size: Medium Adult   Pulse: 86   Temp: 97.9 °F (36.6 °C)   TempSrc: Temporal   SpO2: 96%   Weight: 75.7 kg (166 lb 12.8 oz)   Height: 1.575 m (5' 2.01\")       Review of Systems   Constitutional:  Negative for appetite change, chills, fatigue and fever.   HENT:  Positive for congestion, postnasal drip, rhinorrhea and sore throat. Negative for ear pain, sinus pressure and trouble swallowing.    Respiratory:  Positive

## 2025-05-01 NOTE — PROGRESS NOTES
Have you been to the ER, urgent care clinic since your last visit?  Hospitalized since your last visit?   NO    Have you seen or consulted any other health care providers outside our system since your last visit?   NO      “Have you had a colorectal cancer screening such as a colonoscopy/FIT/Cologuard?    NO    Date of last Colonoscopy: 11/18/2020  No cologuard on file  Date of last FIT: 10/14/2020   No flexible sigmoidoscopy on file

## 2025-05-29 ENCOUNTER — OFFICE VISIT (OUTPATIENT)
Dept: FAMILY MEDICINE CLINIC | Age: 75
End: 2025-05-29
Payer: MEDICARE

## 2025-05-29 VITALS
HEIGHT: 62 IN | DIASTOLIC BLOOD PRESSURE: 74 MMHG | HEART RATE: 56 BPM | SYSTOLIC BLOOD PRESSURE: 132 MMHG | OXYGEN SATURATION: 94 % | TEMPERATURE: 97.9 F | WEIGHT: 167 LBS | BODY MASS INDEX: 30.73 KG/M2

## 2025-05-29 DIAGNOSIS — E78.2 MIXED HYPERLIPIDEMIA: ICD-10-CM

## 2025-05-29 DIAGNOSIS — Z12.5 SCREENING PSA (PROSTATE SPECIFIC ANTIGEN): ICD-10-CM

## 2025-05-29 DIAGNOSIS — E03.9 HYPOTHYROIDISM, UNSPECIFIED TYPE: ICD-10-CM

## 2025-05-29 DIAGNOSIS — R73.02 IGT (IMPAIRED GLUCOSE TOLERANCE): ICD-10-CM

## 2025-05-29 DIAGNOSIS — I25.110 CORONARY ARTERY DISEASE INVOLVING NATIVE CORONARY ARTERY OF NATIVE HEART WITH UNSTABLE ANGINA PECTORIS (HCC): ICD-10-CM

## 2025-05-29 DIAGNOSIS — Z00.00 MEDICARE ANNUAL WELLNESS VISIT, SUBSEQUENT: Primary | ICD-10-CM

## 2025-05-29 DIAGNOSIS — Z12.11 COLON CANCER SCREENING: ICD-10-CM

## 2025-05-29 DIAGNOSIS — M71.22 BAKER CYST, LEFT: ICD-10-CM

## 2025-05-29 LAB
ALBUMIN SERPL-MCNC: 4.4 G/DL (ref 3.4–5)
ALBUMIN/GLOB SERPL: 1.7 {RATIO} (ref 1.1–2.2)
ALP SERPL-CCNC: 75 U/L (ref 40–129)
ALT SERPL-CCNC: 17 U/L (ref 10–40)
ANION GAP SERPL CALCULATED.3IONS-SCNC: 11 MMOL/L (ref 3–16)
AST SERPL-CCNC: 20 U/L (ref 15–37)
BASOPHILS # BLD: 0 K/UL (ref 0–0.2)
BASOPHILS NFR BLD: 0.5 %
BILIRUB SERPL-MCNC: 0.4 MG/DL (ref 0–1)
BUN SERPL-MCNC: 20 MG/DL (ref 7–20)
CALCIUM SERPL-MCNC: 9.2 MG/DL (ref 8.3–10.6)
CHLORIDE SERPL-SCNC: 104 MMOL/L (ref 99–110)
CHOLEST SERPL-MCNC: 155 MG/DL (ref 0–199)
CO2 SERPL-SCNC: 26 MMOL/L (ref 21–32)
CREAT SERPL-MCNC: 0.8 MG/DL (ref 0.8–1.3)
DEPRECATED RDW RBC AUTO: 13.9 % (ref 12.4–15.4)
EOSINOPHIL # BLD: 0.2 K/UL (ref 0–0.6)
EOSINOPHIL NFR BLD: 4.7 %
EST. AVERAGE GLUCOSE BLD GHB EST-MCNC: 125.5 MG/DL
GFR SERPLBLD CREATININE-BSD FMLA CKD-EPI: >90 ML/MIN/{1.73_M2}
GLUCOSE SERPL-MCNC: 112 MG/DL (ref 70–99)
HBA1C MFR BLD: 6 %
HCT VFR BLD AUTO: 44.7 % (ref 40.5–52.5)
HDLC SERPL-MCNC: 49 MG/DL (ref 40–60)
HGB BLD-MCNC: 15.3 G/DL (ref 13.5–17.5)
LDLC SERPL CALC-MCNC: 94 MG/DL
LYMPHOCYTES # BLD: 1.2 K/UL (ref 1–5.1)
LYMPHOCYTES NFR BLD: 24.6 %
MCH RBC QN AUTO: 31 PG (ref 26–34)
MCHC RBC AUTO-ENTMCNC: 34.3 G/DL (ref 31–36)
MCV RBC AUTO: 90.3 FL (ref 80–100)
MONOCYTES # BLD: 0.5 K/UL (ref 0–1.3)
MONOCYTES NFR BLD: 10 %
NEUTROPHILS # BLD: 3 K/UL (ref 1.7–7.7)
NEUTROPHILS NFR BLD: 60.2 %
PLATELET # BLD AUTO: 186 K/UL (ref 135–450)
PMV BLD AUTO: 8.3 FL (ref 5–10.5)
POTASSIUM SERPL-SCNC: 5.3 MMOL/L (ref 3.5–5.1)
PROT SERPL-MCNC: 7 G/DL (ref 6.4–8.2)
PSA SERPL DL<=0.01 NG/ML-MCNC: 0.18 NG/ML (ref 0–4)
RBC # BLD AUTO: 4.95 M/UL (ref 4.2–5.9)
SODIUM SERPL-SCNC: 141 MMOL/L (ref 136–145)
TRIGL SERPL-MCNC: 59 MG/DL (ref 0–150)
TSH SERPL DL<=0.005 MIU/L-ACNC: 3.63 UIU/ML (ref 0.27–4.2)
VLDLC SERPL CALC-MCNC: 12 MG/DL
WBC # BLD AUTO: 5 K/UL (ref 4–11)

## 2025-05-29 PROCEDURE — G0439 PPPS, SUBSEQ VISIT: HCPCS | Performed by: FAMILY MEDICINE

## 2025-05-29 PROCEDURE — 1123F ACP DISCUSS/DSCN MKR DOCD: CPT | Performed by: FAMILY MEDICINE

## 2025-05-29 PROCEDURE — 3075F SYST BP GE 130 - 139MM HG: CPT | Performed by: FAMILY MEDICINE

## 2025-05-29 PROCEDURE — 1159F MED LIST DOCD IN RCRD: CPT | Performed by: FAMILY MEDICINE

## 2025-05-29 PROCEDURE — 1160F RVW MEDS BY RX/DR IN RCRD: CPT | Performed by: FAMILY MEDICINE

## 2025-05-29 PROCEDURE — 3078F DIAST BP <80 MM HG: CPT | Performed by: FAMILY MEDICINE

## 2025-05-29 RX ORDER — OXYCODONE AND ACETAMINOPHEN 5; 325 MG/1; MG/1
1 TABLET ORAL EVERY 6 HOURS PRN
COMMUNITY
Start: 2025-01-30 | End: 2025-05-29

## 2025-05-29 RX ORDER — DOXYCYCLINE 100 MG/1
100 CAPSULE ORAL 2 TIMES DAILY
COMMUNITY
Start: 2025-01-30 | End: 2025-05-29

## 2025-05-29 SDOH — ECONOMIC STABILITY: FOOD INSECURITY: WITHIN THE PAST 12 MONTHS, THE FOOD YOU BOUGHT JUST DIDN'T LAST AND YOU DIDN'T HAVE MONEY TO GET MORE.: NEVER TRUE

## 2025-05-29 SDOH — ECONOMIC STABILITY: FOOD INSECURITY: WITHIN THE PAST 12 MONTHS, YOU WORRIED THAT YOUR FOOD WOULD RUN OUT BEFORE YOU GOT MONEY TO BUY MORE.: NEVER TRUE

## 2025-05-29 ASSESSMENT — PATIENT HEALTH QUESTIONNAIRE - PHQ9
3. TROUBLE FALLING OR STAYING ASLEEP: NOT AT ALL
SUM OF ALL RESPONSES TO PHQ QUESTIONS 1-9: 0
1. LITTLE INTEREST OR PLEASURE IN DOING THINGS: NOT AT ALL
5. POOR APPETITE OR OVEREATING: NOT AT ALL
7. TROUBLE CONCENTRATING ON THINGS, SUCH AS READING THE NEWSPAPER OR WATCHING TELEVISION: NOT AT ALL
8. MOVING OR SPEAKING SO SLOWLY THAT OTHER PEOPLE COULD HAVE NOTICED. OR THE OPPOSITE, BEING SO FIGETY OR RESTLESS THAT YOU HAVE BEEN MOVING AROUND A LOT MORE THAN USUAL: NOT AT ALL
6. FEELING BAD ABOUT YOURSELF - OR THAT YOU ARE A FAILURE OR HAVE LET YOURSELF OR YOUR FAMILY DOWN: NOT AT ALL
9. THOUGHTS THAT YOU WOULD BE BETTER OFF DEAD, OR OF HURTING YOURSELF: NOT AT ALL
2. FEELING DOWN, DEPRESSED OR HOPELESS: NOT AT ALL
10. IF YOU CHECKED OFF ANY PROBLEMS, HOW DIFFICULT HAVE THESE PROBLEMS MADE IT FOR YOU TO DO YOUR WORK, TAKE CARE OF THINGS AT HOME, OR GET ALONG WITH OTHER PEOPLE: NOT DIFFICULT AT ALL
4. FEELING TIRED OR HAVING LITTLE ENERGY: NOT AT ALL

## 2025-05-29 ASSESSMENT — LIFESTYLE VARIABLES
HOW OFTEN DO YOU HAVE A DRINK CONTAINING ALCOHOL: 2-3 TIMES A WEEK
HOW MANY STANDARD DRINKS CONTAINING ALCOHOL DO YOU HAVE ON A TYPICAL DAY: 1 OR 2

## 2025-05-29 NOTE — PROGRESS NOTES
Medicare Annual Wellness Visit    Tristan Quiroz is here for Medicare AWV (Fasting/)    Assessment & Plan    Medicare annual wellness visit, subsequent  - PSA levels were within the normal range approximately a year ago  - Due for a colon cancer screening, previously deferred due to the unpleasantness of the preparation process  - Comprehensive blood workup will be ordered, including a cholesterol check and a repeat PSA test  - A note will be sent to the gastroenterology department at [facility], requesting the use of pills for the colonoscopy preparation if feasible  Colon cancer screening  -     [facility] - Clara Thompson MD, Gastroenterology, [facility]  Hypothyroidism, unspecified type  -     Comprehensive Metabolic Panel; Future  -     Lipid Panel; Future  -     TSH reflex to FT4; Future  Mixed hyperlipidemia  -     Comprehensive Metabolic Panel; Future  -     Lipid Panel; Future  -     TSH reflex to FT4; Future  Coronary artery disease involving native coronary artery of native heart with unstable angina pectoris (HCC)  -     CBC with Auto Differential; Future  -     Comprehensive Metabolic Panel; Future  -     Lipid Panel; Future  -     TSH reflex to FT4; Future  IGT (impaired glucose tolerance)  -     CBC with Auto Differential; Future  -     Comprehensive Metabolic Panel; Future  -     Lipid Panel; Future  -     Hemoglobin A1C; Future  Screening PSA (prostate specific antigen)  -     PSA Screening; Future  Left knee Baker's cyst  - Reports a puffy, blister-like swelling behind the knee, consistent with a Baker's cyst  - Condition involves a pocket of fluid due to knee inflammation, causing pain and potentially interfering with knee bending  - Advised to consult with an orthopedic specialist for further evaluation and potential treatment options  - Physical examination to confirm the presence of a Baker's cyst  Results  Lab Review   Hospital Outpatient Visit on 01/17/2025   Component Date Value    Protime

## 2025-05-31 DIAGNOSIS — E03.9 HYPOTHYROIDISM, UNSPECIFIED TYPE: ICD-10-CM

## 2025-06-02 RX ORDER — CLOPIDOGREL BISULFATE 75 MG/1
75 TABLET ORAL DAILY
Qty: 90 TABLET | Refills: 2 | Status: SHIPPED | OUTPATIENT
Start: 2025-06-02

## 2025-06-02 RX ORDER — LEVOTHYROXINE SODIUM 50 UG/1
50 TABLET ORAL DAILY
Qty: 90 TABLET | Refills: 2 | Status: SHIPPED | OUTPATIENT
Start: 2025-06-02

## 2025-06-02 NOTE — TELEPHONE ENCOUNTER
Lab Results   Component Value Date    WBC 5.0 05/29/2025    HGB 15.3 05/29/2025    HCT 44.7 05/29/2025    MCV 90.3 05/29/2025     05/29/2025     Patient last seen on 1/17/25. Advised to follow up 1 year. Next appointment none.

## 2025-06-02 NOTE — TELEPHONE ENCOUNTER
Refill Request     CONFIRM preferred pharmacy with the patient.    If Mail Order Rx - Pend for 90 day refill.      Last Seen: Last Seen Department: 5/29/2025  Last Seen by PCP: 1/17/2025    Last Written: 5/24/2024    If no future appointment scheduled:  Review the last OV with PCP and review information for follow-up visit,  Route STAFF MESSAGE with patient name to the  Pool for scheduling with the following information:            -  Timing of next visit           -  Visit type ie Physical, OV, etc           -  Diagnoses/Reason ie. COPD, HTN - Do not use MEDICATION, Follow-up or CHECK UP - Give reason for visit      Next Appointment:   Future Appointments   Date Time Provider Department Center   6/4/2026  8:00 AM Bertha Eastman MD [facility] DEP       Message sent to  to schedule appt with patient?  NO      Requested Prescriptions     Pending Prescriptions Disp Refills    levothyroxine (SYNTHROID) 50 MCG tablet [Pharmacy Med Name: LEVOTHYROXINE 50 MCG TABLET] 90 tablet 2     Sig: TAKE 1 TABLET BY MOUTH EVERY DAY

## 2025-06-05 ENCOUNTER — RESULTS FOLLOW-UP (OUTPATIENT)
Dept: FAMILY MEDICINE CLINIC | Age: 75
End: 2025-06-05

## (undated) DEVICE — SUTURE NONABSORBABLE MONOFILAMENT 3-0 PS-1 18 IN BLK ETHILON 1663H

## (undated) DEVICE — Z CONVERTED USE 2273232 BANDAGE COMPR W6INXL11YD E KNIT DBL SELF CLSR EZE-BAND

## (undated) DEVICE — TUBE IRRIG L8IN LNG PT W/ CONN FOR PMP SYS REDEUCE

## (undated) DEVICE — ENDOSCOPIC CANNULATED DRILL BIT,                                    4.5 MM, STERILE

## (undated) DEVICE — WORKING LENGTH 235CM, WORKING CHANNEL 2.8MM: Brand: RESOLUTION 360 CLIP

## (undated) DEVICE — 4-PORT MANIFOLD: Brand: NEPTUNE 2

## (undated) DEVICE — 2.4MM X 10 INCH DRILL-TIP GUIDE WIRE: Brand: ENDOBUTTON

## (undated) DEVICE — SET ADMIN PRIMING 7ML L30IN 7.35LB 20 GTT 2ND RLER CLMP

## (undated) DEVICE — PACK PROCEDURE SURG ARTHSCP CUST

## (undated) DEVICE — GOWN,REINFORCED,POLY,AURORA,XXLARGE,STR: Brand: MEDLINE

## (undated) DEVICE — FIRSTPASS MINI STRAIGHT SUTURE PASSER: Brand: FIRSTPASS

## (undated) DEVICE — 3M™ TEGADERM™ TRANSPARENT FILM DRESSING FRAME STYLE, 1624W, 2-3/8 IN X 2-3/4 IN (6 CM X 7 CM), 100/CT 4CT/CASE: Brand: 3M™ TEGADERM™

## (undated) DEVICE — SOLUTION IRRIG 5L LAC R BG

## (undated) DEVICE — (6) MINITAPE (BLUE) 39.5: Brand: MINITAPE

## (undated) DEVICE — ACCU-PASS SUTURE SHUTTLE CRESCENT, STERILE: Brand: ACCU-PASS

## (undated) DEVICE — GLOVE ORTHO 8   MSG9480

## (undated) DEVICE — T-DRAPE,EXTREMITY,STERILE: Brand: MEDLINE

## (undated) DEVICE — TUBING PMP L8FT LNG W/ CONN FOR AR-6400 REDEUCE

## (undated) DEVICE — GAUZE,SPONGE,4"X4",16PLY,STRL,LF,10/TRAY: Brand: MEDLINE

## (undated) DEVICE — MEDI-VAC NON-CONDUCTIVE SUCTION TUBING: Brand: CARDINAL HEALTH

## (undated) DEVICE — ULTRABRAID NO.2 WHITE SUTURE AND                                    NEEDLE ASSEMBLY, 38 INCH, 10 PER                                    BOX, STERILE: Brand: ULTRABRAID

## (undated) DEVICE — 3.5 MM FULL RADIUS STRAIGHT                                    BLADES, POWER/EP-1, BEIGE, PACKAGED                                    6 PER BOX, STERILE

## (undated) DEVICE — SET GRAV VENT NVENT CK VLV 3 NDL FREE PRT 10 GTT

## (undated) DEVICE — TRAP POLYP ETRAP

## (undated) DEVICE — FAST-FIX 360 STRAIGHT KNOT                                    PUSHER/CUTTER AND SLOTTED CANNULA SETS: Brand: FAST-FIX

## (undated) DEVICE — CATHETER IV 20GA L1.25IN PNK FEP SFTY STR HUB RADPQ DISP

## (undated) DEVICE — ENDO CARRY-ON PROCEDURE KIT INCLUDES SUCTION TUBING, LUBRICANT, GAUZE, BIOHAZARD STICKER, TRANSPORT PAD AND INTERCEPT BEDSIDE KIT.: Brand: ENDO CARRY-ON PROCEDURE KIT

## (undated) DEVICE — PADDING CAST W6INXL4YD NONSTERILE COT RAYON MICROPLEATED

## (undated) DEVICE — ELECTRODE PT RET AD L9FT HI MOIST COND ADH HYDRGEL CORDED

## (undated) DEVICE — (6) MINITAPE (COBRAID BLUE) 39.5: Brand: MINITAPE

## (undated) DEVICE — SOLUTION IV 1000ML LAC RINGERS PH 6.5 INJ USP VIAFLX PLAS

## (undated) DEVICE — ZIMMER® STERILE DISPOSABLE TOURNIQUET CUFF WITH PLC, DUAL PORT, SINGLE BLADDER, 30 IN. (76 CM)